# Patient Record
Sex: FEMALE | Race: WHITE | NOT HISPANIC OR LATINO | Employment: UNEMPLOYED | URBAN - METROPOLITAN AREA
[De-identification: names, ages, dates, MRNs, and addresses within clinical notes are randomized per-mention and may not be internally consistent; named-entity substitution may affect disease eponyms.]

---

## 2017-07-19 ENCOUNTER — ALLSCRIPTS OFFICE VISIT (OUTPATIENT)
Dept: OTHER | Facility: OTHER | Age: 52
End: 2017-07-19

## 2017-07-26 ENCOUNTER — ALLSCRIPTS OFFICE VISIT (OUTPATIENT)
Dept: OTHER | Facility: OTHER | Age: 52
End: 2017-07-26

## 2018-01-13 VITALS
HEIGHT: 67 IN | DIASTOLIC BLOOD PRESSURE: 60 MMHG | WEIGHT: 138 LBS | HEART RATE: 67 BPM | OXYGEN SATURATION: 97 % | SYSTOLIC BLOOD PRESSURE: 114 MMHG | TEMPERATURE: 97.9 F | BODY MASS INDEX: 21.66 KG/M2

## 2018-01-14 VITALS
BODY MASS INDEX: 21.48 KG/M2 | HEART RATE: 96 BPM | DIASTOLIC BLOOD PRESSURE: 74 MMHG | SYSTOLIC BLOOD PRESSURE: 94 MMHG | WEIGHT: 137.13 LBS

## 2018-03-01 ENCOUNTER — OFFICE VISIT (OUTPATIENT)
Dept: INTERNAL MEDICINE CLINIC | Facility: CLINIC | Age: 53
End: 2018-03-01

## 2018-03-01 DIAGNOSIS — F31.63 BIPOLAR 1 DISORDER, MIXED, SEVERE (HCC): Primary | ICD-10-CM

## 2018-03-01 PROCEDURE — 99213 OFFICE O/P EST LOW 20 MIN: CPT | Performed by: NURSE PRACTITIONER

## 2018-03-01 RX ORDER — CARBAMAZEPINE 200 MG/1
200 TABLET, EXTENDED RELEASE ORAL 2 TIMES DAILY
Qty: 12 TABLET | Refills: 0 | Status: SHIPPED | OUTPATIENT
Start: 2018-03-01 | End: 2018-03-01

## 2018-03-01 RX ORDER — CARBAMAZEPINE 200 MG/1
200 TABLET ORAL 2 TIMES DAILY
Qty: 12 TABLET | Refills: 0 | Status: SHIPPED | OUTPATIENT
Start: 2018-03-01 | End: 2018-03-05 | Stop reason: SDUPTHER

## 2018-03-01 RX ORDER — RISPERIDONE 1 MG/1
1 TABLET, FILM COATED ORAL
Qty: 6 TABLET | Refills: 0 | Status: SHIPPED | OUTPATIENT
Start: 2018-03-01

## 2018-03-01 NOTE — PROGRESS NOTES
Assessment/Plan:    Bipolar with manic episode she has been off of her medications for the last 6 months  She is not of any threat to herself or others  She has no suicidal homicidal ideations  I reviewed her last medications that were last filled in July  We will restart Tegretol 200 milligrams b i d  and risperidone 1 milligram at bedtime  I have given her local numbers for Psychiatry to try to establish with  If she has worsening of symptoms she understands that she needs to go to the emergency room for crisis intervention  She will follow back on Monday appointment has been made  Diagnoses and all orders for this visit:    Bipolar 1 disorder, mixed, severe ([facility] [address] )  -     Discontinue: carBAMazepine (TEGretol XR) 200 mg 12 hr tablet; Take 1 tablet (200 mg total) by mouth 2 (two) times a day  -     risperiDONE (RisperDAL) 1 mg tablet; Take 1 tablet (1 mg total) by mouth daily at bedtime  -     carBAMazepine (TEGretol) 200 mg tablet; Take 1 tablet (200 mg total) by mouth 2 (two) times a day          Subjective:      Patient ID: Doe Mora is a 50 y o  female  Patient presents today and was originally being evaluated by a another provider in the office  Patient became quite anxious and verbally abusive to the provider  It was noted that this was our patient so I entered the room  Patient states that she is having a manic episode  She is been doing well but she did come off of her medications back in July because she missed her [facility] appointment in the not would not refill her meds  The most part she had been doing well into the last several weeks for she felt that she was becoming manic  She also complains of depression  She has no suicidal homicidal ideations  She is not drinking alcohol          The following portions of the patient's history were reviewed and updated as appropriate: allergies, current medications, past family history, past medical history, past social history, past surgical history and problem list     Review of Systems   Constitutional: Positive for activity change and appetite change  Negative for chills, diaphoresis, fatigue, fever and unexpected weight change  HENT: Negative  Negative for sneezing  Eyes: Negative for visual disturbance  Respiratory: Negative for chest tightness and shortness of breath  Cardiovascular: Negative for chest pain, palpitations and leg swelling  Gastrointestinal: Negative for abdominal pain and blood in stool  Endocrine: Negative for cold intolerance, heat intolerance, polydipsia, polyphagia and polyuria  Genitourinary: Negative for difficulty urinating, dysuria, frequency and urgency  Musculoskeletal: Negative for arthralgias and myalgias  Skin: Negative for rash and wound  Neurological: Negative for dizziness, weakness, light-headedness and headaches  Hematological: Negative for adenopathy  Psychiatric/Behavioral: Positive for agitation, behavioral problems and decreased concentration  Negative for confusion, dysphoric mood and sleep disturbance  The patient is nervous/anxious and is hyperactive  Objective: There were no vitals taken for this visit  Physical Exam   Constitutional: She is oriented to person, place, and time  She appears well-developed  No distress  HENT:   Head: Normocephalic and atraumatic  Nose: Nose normal    Mouth/Throat: Oropharynx is clear and moist    Eyes: Conjunctivae and EOM are normal  Pupils are equal, round, and reactive to light  Neck: Normal range of motion  Neck supple  No JVD present  No tracheal deviation present  No thyromegaly present  Cardiovascular: Normal rate, regular rhythm, normal heart sounds and intact distal pulses  Exam reveals no gallop and no friction rub  No murmur heard  Pulmonary/Chest: Effort normal and breath sounds normal  No respiratory distress  She has no wheezes  She has no rales  Abdominal: Soft   Bowel sounds are normal  She exhibits no distension  There is no tenderness  Musculoskeletal: Normal range of motion  She exhibits no edema  Lymphadenopathy:     She has no cervical adenopathy  Neurological: She is alert and oriented to person, place, and time  No cranial nerve deficit  Skin: Skin is warm and dry  No rash noted  She is not diaphoretic  Psychiatric: She has a normal mood and affect   Her behavior is normal  Judgment and thought content normal

## 2018-03-01 NOTE — PATIENT INSTRUCTIONS
Bipolar with manic episode she has been off of her medications for the last 6 months  She is not of any threat to herself or others  She has no suicidal homicidal ideations  I reviewed her last medications that were last filled in July  We will restart Tegretol 200 milligrams b i d  and risperidone 1 milligram at bedtime  I have given her local numbers for Psychiatry to try to establish with  If she has worsening of symptoms she understands that she needs to go to the emergency room for crisis intervention  She will follow back on Monday appointment has been made

## 2018-03-05 ENCOUNTER — OFFICE VISIT (OUTPATIENT)
Dept: INTERNAL MEDICINE CLINIC | Facility: CLINIC | Age: 53
End: 2018-03-05
Payer: COMMERCIAL

## 2018-03-05 ENCOUNTER — APPOINTMENT (OUTPATIENT)
Dept: LAB | Facility: CLINIC | Age: 53
End: 2018-03-05
Payer: COMMERCIAL

## 2018-03-05 ENCOUNTER — TRANSCRIBE ORDERS (OUTPATIENT)
Dept: LAB | Facility: CLINIC | Age: 53
End: 2018-03-05

## 2018-03-05 VITALS
WEIGHT: 141 LBS | SYSTOLIC BLOOD PRESSURE: 120 MMHG | BODY MASS INDEX: 22.08 KG/M2 | DIASTOLIC BLOOD PRESSURE: 76 MMHG | HEART RATE: 110 BPM

## 2018-03-05 DIAGNOSIS — F31.9 BIPOLAR 1 DISORDER (HCC): ICD-10-CM

## 2018-03-05 DIAGNOSIS — N92.6 MENSTRUAL PERIODS, ABNORMAL: ICD-10-CM

## 2018-03-05 DIAGNOSIS — E78.49 OTHER HYPERLIPIDEMIA: ICD-10-CM

## 2018-03-05 DIAGNOSIS — I10 ESSENTIAL HYPERTENSION: ICD-10-CM

## 2018-03-05 DIAGNOSIS — Z11.59 NEED FOR HEPATITIS C SCREENING TEST: ICD-10-CM

## 2018-03-05 DIAGNOSIS — R73.01 IMPAIRED FASTING GLUCOSE: ICD-10-CM

## 2018-03-05 DIAGNOSIS — F31.63 BIPOLAR 1 DISORDER, MIXED, SEVERE (HCC): ICD-10-CM

## 2018-03-05 DIAGNOSIS — F31.9 BIPOLAR 1 DISORDER (HCC): Primary | ICD-10-CM

## 2018-03-05 LAB
ALBUMIN SERPL BCP-MCNC: 3.7 G/DL (ref 3.5–5)
ALP SERPL-CCNC: 81 U/L (ref 46–116)
ALT SERPL W P-5'-P-CCNC: 16 U/L (ref 12–78)
ANION GAP SERPL CALCULATED.3IONS-SCNC: 5 MMOL/L (ref 4–13)
AST SERPL W P-5'-P-CCNC: 14 U/L (ref 5–45)
BASOPHILS # BLD AUTO: 0.03 THOUSANDS/ΜL (ref 0–0.1)
BASOPHILS NFR BLD AUTO: 1 % (ref 0–1)
BILIRUB SERPL-MCNC: 0.26 MG/DL (ref 0.2–1)
BUN SERPL-MCNC: 10 MG/DL (ref 5–25)
CALCIUM SERPL-MCNC: 8.4 MG/DL (ref 8.3–10.1)
CHLORIDE SERPL-SCNC: 104 MMOL/L (ref 100–108)
CHOLEST SERPL-MCNC: 160 MG/DL (ref 50–200)
CO2 SERPL-SCNC: 31 MMOL/L (ref 21–32)
CREAT SERPL-MCNC: 0.81 MG/DL (ref 0.6–1.3)
EOSINOPHIL # BLD AUTO: 0.31 THOUSAND/ΜL (ref 0–0.61)
EOSINOPHIL NFR BLD AUTO: 5 % (ref 0–6)
ERYTHROCYTE [DISTWIDTH] IN BLOOD BY AUTOMATED COUNT: 14.2 % (ref 11.6–15.1)
EST. AVERAGE GLUCOSE BLD GHB EST-MCNC: 114 MG/DL
GFR SERPL CREATININE-BSD FRML MDRD: 85 ML/MIN/1.73SQ M
GLUCOSE SERPL-MCNC: 84 MG/DL (ref 65–140)
HBA1C MFR BLD: 5.6 % (ref 4.2–6.3)
HCG SERPL QL: NEGATIVE
HCT VFR BLD AUTO: 39.7 % (ref 34.8–46.1)
HDLC SERPL-MCNC: 62 MG/DL (ref 40–60)
HGB BLD-MCNC: 13.3 G/DL (ref 11.5–15.4)
LDLC SERPL CALC-MCNC: 75 MG/DL (ref 0–100)
LYMPHOCYTES # BLD AUTO: 2.22 THOUSANDS/ΜL (ref 0.6–4.47)
LYMPHOCYTES NFR BLD AUTO: 38 % (ref 14–44)
MCH RBC QN AUTO: 30.1 PG (ref 26.8–34.3)
MCHC RBC AUTO-ENTMCNC: 33.5 G/DL (ref 31.4–37.4)
MCV RBC AUTO: 90 FL (ref 82–98)
MONOCYTES # BLD AUTO: 0.55 THOUSAND/ΜL (ref 0.17–1.22)
MONOCYTES NFR BLD AUTO: 9 % (ref 4–12)
NEUTROPHILS # BLD AUTO: 2.79 THOUSANDS/ΜL (ref 1.85–7.62)
NEUTS SEG NFR BLD AUTO: 47 % (ref 43–75)
NRBC BLD AUTO-RTO: 0 /100 WBCS
PLATELET # BLD AUTO: 330 THOUSANDS/UL (ref 149–390)
PMV BLD AUTO: 10 FL (ref 8.9–12.7)
POTASSIUM SERPL-SCNC: 3.7 MMOL/L (ref 3.5–5.3)
PROT SERPL-MCNC: 7 G/DL (ref 6.4–8.2)
RBC # BLD AUTO: 4.42 MILLION/UL (ref 3.81–5.12)
SODIUM SERPL-SCNC: 140 MMOL/L (ref 136–145)
TRIGL SERPL-MCNC: 116 MG/DL
TSH SERPL DL<=0.05 MIU/L-ACNC: 0.59 UIU/ML (ref 0.36–3.74)
WBC # BLD AUTO: 5.92 THOUSAND/UL (ref 4.31–10.16)

## 2018-03-05 PROCEDURE — 99213 OFFICE O/P EST LOW 20 MIN: CPT | Performed by: NURSE PRACTITIONER

## 2018-03-05 PROCEDURE — 80053 COMPREHEN METABOLIC PANEL: CPT

## 2018-03-05 PROCEDURE — 85025 COMPLETE CBC W/AUTO DIFF WBC: CPT

## 2018-03-05 PROCEDURE — 80061 LIPID PANEL: CPT

## 2018-03-05 PROCEDURE — 86803 HEPATITIS C AB TEST: CPT

## 2018-03-05 PROCEDURE — 84443 ASSAY THYROID STIM HORMONE: CPT

## 2018-03-05 PROCEDURE — 84703 CHORIONIC GONADOTROPIN ASSAY: CPT

## 2018-03-05 PROCEDURE — 83036 HEMOGLOBIN GLYCOSYLATED A1C: CPT

## 2018-03-05 PROCEDURE — 36415 COLL VENOUS BLD VENIPUNCTURE: CPT

## 2018-03-05 PROCEDURE — 3074F SYST BP LT 130 MM HG: CPT | Performed by: NURSE PRACTITIONER

## 2018-03-05 PROCEDURE — 3078F DIAST BP <80 MM HG: CPT | Performed by: NURSE PRACTITIONER

## 2018-03-05 RX ORDER — CARBAMAZEPINE 200 MG/1
200 TABLET ORAL 2 TIMES DAILY
Qty: 60 TABLET | Refills: 0 | Status: SHIPPED | OUTPATIENT
Start: 2018-03-05 | End: 2018-03-05 | Stop reason: SDUPTHER

## 2018-03-05 RX ORDER — CARBAMAZEPINE 200 MG/1
200 TABLET ORAL 2 TIMES DAILY
Qty: 60 TABLET | Refills: 0 | Status: SHIPPED | OUTPATIENT
Start: 2018-03-05

## 2018-03-05 NOTE — PATIENT INSTRUCTIONS
Bipolar type 1 with manic episode she is improved carbapazapine the risperidone was too sedating  I discussed she can take 1/2 tablet as needed  We will continue current dose of carbamazepine add Latuda- she is still having hard time finding psychiatric care  She will call [facility] to see if they will take her back    Follow up 3/12

## 2018-03-05 NOTE — PROGRESS NOTES
Assessment/Plan:    Bipolar type 1 with manic episode she is improved carbapazapine the risperidone was too sedating  I discussed she can take 1/2 tablet as needed  We will continue current dose of carbamazepine add Latuda- she is still having hard time finding psychiatric care  She will call [facility] to see if they will take her back   Follow up 3/12       Diagnoses and all orders for this visit:    Bipolar 1 disorder ([address] )  -     lurasidone (LATUDA) 20 mg tablet; Take 1 tablet (20 mg total) by mouth daily with breakfast  -     TSH, 3rd generation; Future    Bipolar 1 disorder, mixed, severe (HCC)  -     carBAMazepine (TEGretol) 200 mg tablet; Take 1 tablet (200 mg total) by mouth 2 (two) times a day    Need for hepatitis C screening test  -     Hepatitis C antibody; Future    Impaired fasting glucose    Essential hypertension  -     Comprehensive metabolic panel; Future  -     TSH, 3rd generation; Future    Other hyperlipidemia  -     CBC and differential; Future  -     Lipid panel; Future  -     HEMOGLOBIN A1C W/ EAG ESTIMATION; Future    Menstrual periods, abnormal  -     Ambulatory referral to Obstetrics / Gynecology; Future  -     Pregnancy Test (HCG Qualitative); Future          Subjective:      Patient ID: Alisha Cuello is a 50 y o  female  Feeling better, resperidone too sedating, feeling 25% better  Still w/ mood swings  No h/s ideations  Having hard time finding psych        The following portions of the patient's history were reviewed and updated as appropriate: allergies, current medications, past family history, past medical history, past social history, past surgical history and problem list     Review of Systems   Constitutional: Negative for appetite change, chills, diaphoresis, fatigue, fever and unexpected weight change  HENT: Negative for postnasal drip and sneezing  Eyes: Negative for visual disturbance  Respiratory: Negative for chest tightness and shortness of breath  Cardiovascular: Negative for chest pain, palpitations and leg swelling  Gastrointestinal: Negative for abdominal pain and blood in stool  Endocrine: Negative for cold intolerance, heat intolerance, polydipsia, polyphagia and polyuria  Genitourinary: Negative for difficulty urinating, dysuria, frequency and urgency  Musculoskeletal: Negative for arthralgias and myalgias  Skin: Negative for rash and wound  Neurological: Negative for dizziness, weakness, light-headedness and headaches  Hematological: Negative for adenopathy  Psychiatric/Behavioral: Negative for confusion, dysphoric mood and sleep disturbance  The patient is not nervous/anxious  Objective:      /76   Pulse (!) 110   Wt 64 kg (141 lb)   BMI 22 08 kg/m²          Physical Exam   Constitutional: She appears well-developed and well-nourished  No distress  HENT:   Head: Normocephalic and atraumatic  Eyes: Conjunctivae and EOM are normal  Pupils are equal, round, and reactive to light  Neck: Normal range of motion  Neck supple  Pulmonary/Chest: Effort normal and breath sounds normal    Abdominal: Soft  Bowel sounds are normal    Neurological: She is alert  Psychiatric: Thought content normal  Her mood appears anxious  Her speech is rapid and/or pressured and tangential  She is hyperactive   Cognition and memory are normal

## 2018-03-06 LAB — HCV AB SER QL: NORMAL

## 2018-03-07 NOTE — PROGRESS NOTES
History of Present Illness    Revaccination   Vaccine Information: Vaccine(s) Given (names): [id number]  Spoke with patient regarding vaccine out of temperature range  Action(s): Pt will be revaccinated  Appointment scheduled: [id number] sd  Revaccination Completed: [id number]  Active Problems    1  Abnormal cytology (792 9) (R89 6)   2  Acute bronchitis (466 0) (J20 9)   3  Allergic rhinitis (477 9) (J30 9)   4  Anxiety (300 00) (F41 9)   5  Bell's palsy (351 0) (G51 0)   6  Bipolar disorder (296 80) (F31 9)   7  Cervicalgia (723 1) (M54 2)   8  COPD with exacerbation (491 21) (J44 1)   9  Depression (311) (F32 9)   10  Dermatitis (692 9) (L30 9)   11  Encounter for screening mammogram for malignant neoplasm of breast (V76 12)    (Z12 31)   12  Flank pain (789 09) (R10 9)   13  Flu vaccine need (V04 81) (Z23)   14  Hoarseness (784 42) (R49 0)   15  Hyperlipidemia (272 4) (E78 5)   16  Impaired fasting glucose (790 21) (R73 01)   17  Ingrown toenail (703 0) (L60 0)   18  Microscopic hematuria (599 72) (R31 29)   19  Need for pneumococcal vaccine (V03 82) (Z23)   20  Need for revaccination (V05 9) (Z23)   21  Nephrolithiasis (592 0) (N20 0)   22  Other screening mammogram (V76 12) (Z12 31)   23  Pap smear, low-risk (V76 2) (Z01 419)   24  Pneumonia (486) (J18 9)   25  Thyroid disorder (246 9) (E07 9)   26  Toxic effect of tobacco (989 84,E980 9) (T65 291A)   27  Undersocialized Aggressive Conduct Disorder (312 00)   28  Urinary incontinence in female (788 30) (R32)   29  Visit for screening mammogram (M68 45) (Z12 31)    Immunizations  Influenza --- Amor Shown: [id number] Jacobsen: 11-Nov-2015   PPSV --- Series1: 11-Nov-2015     Current Meds   1  Acetaminophen-Codeine #3 300-30 MG Oral Tablet; Take one tablet every four hours as   needed for pain   2  Albuterol Sulfate (2 5 MG/3ML) 0 083% Inhalation Nebulization Solution; USE 1 UNIT   DOSE IN NEBULIZER EVERY 4 TO 6 HOURS AS NEEDED   3   Cyclobenzaprine HCl - 10 MG Oral Tablet; take 1 tablet at night as needed for pain   4  Fluticasone Propionate 50 MCG/ACT Nasal Suspension; USE 2 SPRAYS IN EACH   NOSTRIL ONCE DAILY   5  Gabapentin 300 MG Oral Capsule; take 1 capsule 3 times daily as needed   6  HydrOXYzine HCl - 50 MG Oral Tablet; 1 po bid, up to 5x per day prn   7  Ibuprofen 800 MG Oral Tablet; Take one tablet every 8 hours as needed for pain   8  Ipratropium-Albuterol 0 5-2 5 (3) MG/3ML Inhalation Solution; USE 1 UNIT DOSE IN   NEBULIZER EVERY 4 HOURS AS NEEDED   9  Lithium Carbonate 150 MG Oral Capsule; TAKE 1 CAPSULE TWICE DAILY   10  ProAir  (90 Base) MCG/ACT Inhalation Aerosol Solution; INHALE 2 PUFFS    EVERY 4 HOURS AS NEEDED   11  Triamcinolone Acetonide 0 5 % External Cream; APPLY SPARINGLY AND MASSAGE IN    TWICE DAILY   12  Voltaren 1 % Transdermal Gel; APPLY SPARINGLY TO AFFECTED AREA(S) ONCE DAILY    Allergies    1   No Known Drug Allergies    Signatures   Electronically signed by : UMANG James ; Dec 21 2016 11:09AM EST

## 2018-03-19 ENCOUNTER — OFFICE VISIT (OUTPATIENT)
Dept: INTERNAL MEDICINE CLINIC | Facility: CLINIC | Age: 53
End: 2018-03-19
Payer: COMMERCIAL

## 2018-03-19 VITALS
WEIGHT: 136 LBS | HEIGHT: 67 IN | DIASTOLIC BLOOD PRESSURE: 82 MMHG | HEART RATE: 88 BPM | OXYGEN SATURATION: 97 % | SYSTOLIC BLOOD PRESSURE: 118 MMHG | BODY MASS INDEX: 21.35 KG/M2 | RESPIRATION RATE: 18 BRPM

## 2018-03-19 DIAGNOSIS — F31.60 BIPOLAR AFFECTIVE DISORDER, CURRENT EPISODE MIXED, CURRENT EPISODE SEVERITY UNSPECIFIED (HCC): Primary | ICD-10-CM

## 2018-03-19 PROCEDURE — 99213 OFFICE O/P EST LOW 20 MIN: CPT | Performed by: NURSE PRACTITIONER

## 2018-03-19 PROCEDURE — 3008F BODY MASS INDEX DOCD: CPT | Performed by: NURSE PRACTITIONER

## 2018-03-19 NOTE — PATIENT INSTRUCTIONS
Bipolar still somewhat manic but markedly improved, she is actually going going for inpatient rehab on Thursday of this week for meth and marijuana use I discussed this is actually a good planned since they have a dual diagnosis program and they will help adjust medications and help with outpatient services    She will follow up after she is discharged from rehab

## 2018-03-19 NOTE — PROGRESS NOTES
Assessment/Plan:    Bipolar still somewhat manic but markedly improved, she is actually going going for inpatient rehab on Thursday of this week for meth and marijuana use I discussed this is actually a good planned since they have a dual diagnosis program and they will help adjust medications and help with outpatient services  She will follow up after she is discharged from rehab       Diagnoses and all orders for this visit:    Bipolar affective disorder, current episode mixed, current episode severity unspecified ([facility] )          Subjective:      Patient ID: Eris Monge is a 50 y o  female  Doing better on her medications she has less anxiety less depression she admits though that she has been regularly using marijuana and on and off using methamphetamine she is scheduled to go to an inpatient rehab on Thursday        The following portions of the patient's history were reviewed and updated as appropriate: allergies, current medications, past family history, past medical history, past social history, past surgical history and problem list     Review of Systems   Constitutional: Negative for appetite change, chills, diaphoresis, fatigue, fever and unexpected weight change  HENT: Negative for postnasal drip and sneezing  Eyes: Negative for visual disturbance  Respiratory: Negative for chest tightness and shortness of breath  Cardiovascular: Negative for chest pain, palpitations and leg swelling  Gastrointestinal: Negative for abdominal pain and blood in stool  Endocrine: Negative for cold intolerance, heat intolerance, polydipsia, polyphagia and polyuria  Genitourinary: Negative for difficulty urinating, dysuria, frequency and urgency  Musculoskeletal: Negative for arthralgias and myalgias  Skin: Negative for rash and wound  Neurological: Negative for dizziness, weakness, light-headedness and headaches  Hematological: Negative for adenopathy     Psychiatric/Behavioral: Negative for confusion, dysphoric mood and sleep disturbance  The patient is not nervous/anxious  Objective:      /82   Pulse 88   Resp 18   Ht 5' 7" (1 702 m)   Wt 61 7 kg (136 lb)   SpO2 97%   BMI 21 30 kg/m²          Physical Exam   Constitutional: She is oriented to person, place, and time  She appears well-developed  No distress  HENT:   Head: Normocephalic and atraumatic  Nose: Nose normal    Mouth/Throat: Oropharynx is clear and moist    Eyes: Conjunctivae and EOM are normal  Pupils are equal, round, and reactive to light  Neck: Normal range of motion  Neck supple  No JVD present  No tracheal deviation present  No thyromegaly present  Cardiovascular: Normal rate, regular rhythm, normal heart sounds and intact distal pulses  Exam reveals no gallop and no friction rub  No murmur heard  Pulmonary/Chest: Effort normal and breath sounds normal  No respiratory distress  She has no wheezes  She has no rales  Abdominal: Soft  Bowel sounds are normal  She exhibits no distension  There is no tenderness  Musculoskeletal: Normal range of motion  She exhibits no edema  Lymphadenopathy:     She has no cervical adenopathy  Neurological: She is alert and oriented to person, place, and time  No cranial nerve deficit  Skin: Skin is warm and dry  No rash noted  She is not diaphoretic  Psychiatric: She has a normal mood and affect   Her behavior is normal  Judgment and thought content normal

## 2018-12-15 ENCOUNTER — APPOINTMENT (EMERGENCY)
Dept: RADIOLOGY | Facility: HOSPITAL | Age: 53
End: 2018-12-15
Payer: COMMERCIAL

## 2018-12-15 ENCOUNTER — HOSPITAL ENCOUNTER (EMERGENCY)
Facility: HOSPITAL | Age: 53
Discharge: HOME | End: 2018-12-15
Attending: EMERGENCY MEDICINE
Payer: COMMERCIAL

## 2018-12-15 ENCOUNTER — APPOINTMENT (EMERGENCY)
Dept: CT IMAGING | Facility: HOSPITAL | Age: 53
End: 2018-12-15
Payer: COMMERCIAL

## 2018-12-15 VITALS
HEART RATE: 77 BPM | TEMPERATURE: 98.1 F | RESPIRATION RATE: 20 BRPM | SYSTOLIC BLOOD PRESSURE: 99 MMHG | OXYGEN SATURATION: 91 % | DIASTOLIC BLOOD PRESSURE: 55 MMHG

## 2018-12-15 DIAGNOSIS — S29.012A STRAIN OF MID-BACK, INITIAL ENCOUNTER: ICD-10-CM

## 2018-12-15 DIAGNOSIS — N39.0 UTI (URINARY TRACT INFECTION): ICD-10-CM

## 2018-12-15 DIAGNOSIS — J20.9 ACUTE BRONCHITIS: Primary | ICD-10-CM

## 2018-12-15 LAB
ALBUMIN SERPL BCP-MCNC: 3.7 G/DL (ref 3.5–5)
ALP SERPL-CCNC: 77 U/L (ref 46–116)
ALT SERPL W P-5'-P-CCNC: 15 U/L (ref 12–78)
ANION GAP SERPL CALCULATED.3IONS-SCNC: 8 MMOL/L (ref 4–13)
AST SERPL W P-5'-P-CCNC: 17 U/L (ref 5–45)
BACTERIA UR QL AUTO: ABNORMAL /HPF
BASOPHILS # BLD AUTO: 0.01 THOUSANDS/ΜL (ref 0–0.1)
BASOPHILS NFR BLD AUTO: 0 % (ref 0–1)
BILIRUB DIRECT SERPL-MCNC: 0.07 MG/DL (ref 0–0.2)
BILIRUB SERPL-MCNC: 0.3 MG/DL (ref 0.2–1)
BILIRUB UR QL STRIP: NEGATIVE
BUN SERPL-MCNC: 12 MG/DL (ref 5–25)
CALCIUM SERPL-MCNC: 8.9 MG/DL (ref 8.3–10.1)
CHLORIDE SERPL-SCNC: 100 MMOL/L (ref 100–108)
CLARITY UR: ABNORMAL
CO2 SERPL-SCNC: 28 MMOL/L (ref 21–32)
COLOR UR: YELLOW
CREAT SERPL-MCNC: 0.88 MG/DL (ref 0.6–1.3)
EOSINOPHIL # BLD AUTO: 0.06 THOUSAND/ΜL (ref 0–0.61)
EOSINOPHIL NFR BLD AUTO: 2 % (ref 0–6)
ERYTHROCYTE [DISTWIDTH] IN BLOOD BY AUTOMATED COUNT: 13.7 % (ref 11.6–15.1)
EXT PREG TEST URINE: NEGATIVE
GFR SERPL CREATININE-BSD FRML MDRD: 77 ML/MIN/1.73SQ M
GLUCOSE SERPL-MCNC: 102 MG/DL (ref 65–140)
GLUCOSE UR STRIP-MCNC: NEGATIVE MG/DL
HCT VFR BLD AUTO: 43.3 % (ref 34.8–46.1)
HGB BLD-MCNC: 14.5 G/DL (ref 11.5–15.4)
HGB UR QL STRIP.AUTO: ABNORMAL
HYALINE CASTS #/AREA URNS LPF: ABNORMAL /LPF
IMM GRANULOCYTES # BLD AUTO: 0.02 THOUSAND/UL (ref 0–0.2)
IMM GRANULOCYTES NFR BLD AUTO: 1 % (ref 0–2)
KETONES UR STRIP-MCNC: ABNORMAL MG/DL
LEUKOCYTE ESTERASE UR QL STRIP: NEGATIVE
LIPASE SERPL-CCNC: 101 U/L (ref 73–393)
LYMPHOCYTES # BLD AUTO: 0.79 THOUSANDS/ΜL (ref 0.6–4.47)
LYMPHOCYTES NFR BLD AUTO: 21 % (ref 14–44)
MCH RBC QN AUTO: 29.8 PG (ref 26.8–34.3)
MCHC RBC AUTO-ENTMCNC: 33.5 G/DL (ref 31.4–37.4)
MCV RBC AUTO: 89 FL (ref 82–98)
MONOCYTES # BLD AUTO: 0.37 THOUSAND/ΜL (ref 0.17–1.22)
MONOCYTES NFR BLD AUTO: 10 % (ref 4–12)
NEUTROPHILS # BLD AUTO: 2.52 THOUSANDS/ΜL (ref 1.85–7.62)
NEUTS SEG NFR BLD AUTO: 66 % (ref 43–75)
NITRITE UR QL STRIP: NEGATIVE
NON-SQ EPI CELLS URNS QL MICRO: ABNORMAL /HPF
NRBC BLD AUTO-RTO: 0 /100 WBCS
PH UR STRIP.AUTO: 5.5 [PH] (ref 4.5–8)
PLATELET # BLD AUTO: 238 THOUSANDS/UL (ref 149–390)
PMV BLD AUTO: 10 FL (ref 8.9–12.7)
POTASSIUM SERPL-SCNC: 3.8 MMOL/L (ref 3.5–5.3)
PROT SERPL-MCNC: 7.5 G/DL (ref 6.4–8.2)
PROT UR STRIP-MCNC: ABNORMAL MG/DL
RBC # BLD AUTO: 4.87 MILLION/UL (ref 3.81–5.12)
RBC #/AREA URNS AUTO: ABNORMAL /HPF
SODIUM SERPL-SCNC: 136 MMOL/L (ref 136–145)
SP GR UR STRIP.AUTO: >=1.03 (ref 1–1.03)
TROPONIN I SERPL-MCNC: <0.02 NG/ML
UROBILINOGEN UR QL STRIP.AUTO: 0.2 E.U./DL
WBC # BLD AUTO: 3.77 THOUSAND/UL (ref 4.31–10.16)
WBC #/AREA URNS AUTO: ABNORMAL /HPF

## 2018-12-15 PROCEDURE — 87077 CULTURE AEROBIC IDENTIFY: CPT | Performed by: EMERGENCY MEDICINE

## 2018-12-15 PROCEDURE — 81001 URINALYSIS AUTO W/SCOPE: CPT | Performed by: EMERGENCY MEDICINE

## 2018-12-15 PROCEDURE — 87086 URINE CULTURE/COLONY COUNT: CPT | Performed by: EMERGENCY MEDICINE

## 2018-12-15 PROCEDURE — 99284 EMERGENCY DEPT VISIT MOD MDM: CPT

## 2018-12-15 PROCEDURE — 96361 HYDRATE IV INFUSION ADD-ON: CPT

## 2018-12-15 PROCEDURE — 83690 ASSAY OF LIPASE: CPT | Performed by: EMERGENCY MEDICINE

## 2018-12-15 PROCEDURE — 71046 X-RAY EXAM CHEST 2 VIEWS: CPT

## 2018-12-15 PROCEDURE — 93005 ELECTROCARDIOGRAM TRACING: CPT

## 2018-12-15 PROCEDURE — 81025 URINE PREGNANCY TEST: CPT | Performed by: EMERGENCY MEDICINE

## 2018-12-15 PROCEDURE — 85025 COMPLETE CBC W/AUTO DIFF WBC: CPT | Performed by: EMERGENCY MEDICINE

## 2018-12-15 PROCEDURE — 96374 THER/PROPH/DIAG INJ IV PUSH: CPT

## 2018-12-15 PROCEDURE — 94640 AIRWAY INHALATION TREATMENT: CPT

## 2018-12-15 PROCEDURE — 80048 BASIC METABOLIC PNL TOTAL CA: CPT | Performed by: EMERGENCY MEDICINE

## 2018-12-15 PROCEDURE — 80076 HEPATIC FUNCTION PANEL: CPT | Performed by: EMERGENCY MEDICINE

## 2018-12-15 PROCEDURE — 84484 ASSAY OF TROPONIN QUANT: CPT | Performed by: EMERGENCY MEDICINE

## 2018-12-15 PROCEDURE — 36415 COLL VENOUS BLD VENIPUNCTURE: CPT | Performed by: EMERGENCY MEDICINE

## 2018-12-15 PROCEDURE — 74177 CT ABD & PELVIS W/CONTRAST: CPT

## 2018-12-15 PROCEDURE — 87186 SC STD MICRODIL/AGAR DIL: CPT | Performed by: EMERGENCY MEDICINE

## 2018-12-15 RX ORDER — PREDNISONE 20 MG/1
60 TABLET ORAL ONCE
Status: COMPLETED | OUTPATIENT
Start: 2018-12-15 | End: 2018-12-15

## 2018-12-15 RX ORDER — BACLOFEN 10 MG/1
10 TABLET ORAL ONCE
Status: COMPLETED | OUTPATIENT
Start: 2018-12-15 | End: 2018-12-15

## 2018-12-15 RX ORDER — ALBUTEROL SULFATE 90 UG/1
2 AEROSOL, METERED RESPIRATORY (INHALATION) ONCE
Status: COMPLETED | OUTPATIENT
Start: 2018-12-15 | End: 2018-12-15

## 2018-12-15 RX ORDER — KETOROLAC TROMETHAMINE 30 MG/ML
15 INJECTION, SOLUTION INTRAMUSCULAR; INTRAVENOUS ONCE
Status: COMPLETED | OUTPATIENT
Start: 2018-12-15 | End: 2018-12-15

## 2018-12-15 RX ORDER — NAPROXEN 500 MG/1
500 TABLET ORAL EVERY 12 HOURS PRN
Qty: 20 TABLET | Refills: 0 | Status: SHIPPED | OUTPATIENT
Start: 2018-12-15

## 2018-12-15 RX ORDER — LIDOCAINE 50 MG/G
1 PATCH TOPICAL ONCE
Status: DISCONTINUED | OUTPATIENT
Start: 2018-12-15 | End: 2018-12-15 | Stop reason: HOSPADM

## 2018-12-15 RX ORDER — BACLOFEN 10 MG/1
10 TABLET ORAL 3 TIMES DAILY PRN
Qty: 10 TABLET | Refills: 0 | Status: SHIPPED | OUTPATIENT
Start: 2018-12-15 | End: 2019-02-14

## 2018-12-15 RX ORDER — AZITHROMYCIN 250 MG/1
TABLET, FILM COATED ORAL
Qty: 6 TABLET | Refills: 0 | Status: SHIPPED | OUTPATIENT
Start: 2018-12-15 | End: 2018-12-19

## 2018-12-15 RX ORDER — PREDNISONE 20 MG/1
40 TABLET ORAL DAILY
Qty: 8 TABLET | Refills: 0 | Status: SHIPPED | OUTPATIENT
Start: 2018-12-16 | End: 2018-12-20

## 2018-12-15 RX ADMIN — ALBUTEROL SULFATE 2 PUFF: 90 AEROSOL, METERED RESPIRATORY (INHALATION) at 15:33

## 2018-12-15 RX ADMIN — IPRATROPIUM BROMIDE 0.5 MG: 0.5 SOLUTION RESPIRATORY (INHALATION) at 13:14

## 2018-12-15 RX ADMIN — BACLOFEN 10 MG: 10 TABLET ORAL at 13:22

## 2018-12-15 RX ADMIN — PREDNISONE 60 MG: 20 TABLET ORAL at 13:02

## 2018-12-15 RX ADMIN — SODIUM CHLORIDE 1000 ML: 0.9 INJECTION, SOLUTION INTRAVENOUS at 13:02

## 2018-12-15 RX ADMIN — IOHEXOL 100 ML: 350 INJECTION, SOLUTION INTRAVENOUS at 13:53

## 2018-12-15 RX ADMIN — LIDOCAINE 1 PATCH: 50 PATCH TOPICAL at 13:04

## 2018-12-15 RX ADMIN — KETOROLAC TROMETHAMINE 15 MG: 30 INJECTION, SOLUTION INTRAMUSCULAR at 13:03

## 2018-12-15 RX ADMIN — ALBUTEROL SULFATE 5 MG: 2.5 SOLUTION RESPIRATORY (INHALATION) at 13:14

## 2018-12-15 NOTE — DISCHARGE INSTRUCTIONS
Acute Bronchitis   WHAT YOU NEED TO KNOW:   Acute bronchitis is swelling and irritation in the air passages of your lungs  This irritation may cause you to cough or have other breathing problems  Acute bronchitis often starts because of another illness, such as a cold or the flu  The illness spreads from your nose and throat to your windpipe and airways  Bronchitis is often called a chest cold  Acute bronchitis lasts about 3 to 6 weeks and is usually not a serious illness  Your cough can last for several weeks  DISCHARGE INSTRUCTIONS:   Return to the emergency department if:   · You cough up blood  · Your lips or fingernails turn blue  · You feel like you are not getting enough air when you breathe  Contact your healthcare provider if:   · You have a fever  · Your breathing problems do not go away or get worse  · Your cough does not get better within 4 weeks  · You have questions or concerns about your condition or care  Self-care:   · Get more rest   Rest helps your body to heal  Slowly start to do more each day  Rest when you feel it is needed  · Avoid irritants in the air  Avoid chemicals, fumes, and dust  Wear a face mask if you must work around dust or fumes  Stay inside on days when air pollution levels are high  If you have allergies, stay inside when pollen counts are high  Do not use aerosol products, such as spray-on deodorant, bug spray, and hair spray  · Do not smoke or be around others who smoke  Nicotine and other chemicals in cigarettes and cigars damages the cilia that move mucus out of your lungs  Ask your healthcare provider for information if you currently smoke and need help to quit  E-cigarettes or smokeless tobacco still contain nicotine  Talk to your healthcare provider before you use these products  · Drink liquids as directed  Liquids help keep your air passages moist and help you cough up mucus   You may need to drink more liquids when you have acute bronchitis  Ask how much liquid to drink each day and which liquids are best for you  · Use a humidifier or vaporizer  Use a cool mist humidifier or a vaporizer to increase air moisture in your home  This may make it easier for you to breathe and help decrease your cough  Decrease risk for acute bronchitis:   · Get the vaccinations you need  Ask your healthcare provider if you should get vaccinated against the flu or pneumonia  · Prevent the spread of germs  You can decrease your risk of acute bronchitis and other illnesses by doing the following:     [facility] your hands often with soap and water  Carry germ-killing hand lotion or gel with you  You can use the lotion or gel to clean your hands when soap and water are not available  ¨ Do not touch your eyes, nose, or mouth unless you have washed your hands first     ¨ Always cover your mouth when you cough to prevent the spread of germs  It is best to cough into a tissue or your shirt sleeve instead of into your hand  Ask those around you cover their mouths when they cough  ¨ Try to avoid people who have a cold or the flu  If you are sick, stay away from others as much as possible  Medicines: Your healthcare provider may  give you any of the following:  · Ibuprofen or acetaminophen  are medicines that help lower your fever  They are available without a doctor's order  Ask your healthcare provider which medicine is right for you  Ask how much to take and how often to take it  Follow directions  These medicines can cause stomach bleeding if not taken correctly  Ibuprofen can cause kidney damage  Do not take ibuprofen if you have kidney disease, an ulcer, or allergies to aspirin  Acetaminophen can cause liver damage  Do not take more than 4,000 milligrams in 24 hours  · Decongestants  help loosen mucus in your lungs and make it easier to cough up  This can help you breathe easier  · Cough suppressants  decrease your urge to cough   If your cough produces mucus, do not take a cough suppressant unless your healthcare provider tells you to  Your healthcare provider may suggest that you take a cough suppressant at night so you can rest     · Inhalers  may be given  Your healthcare provider may give you one or more inhalers to help you breathe easier and cough less  An inhaler gives your medicine to open your airways  Ask your healthcare provider to show you how to use your inhaler correctly  · Take your medicine as directed  Contact your healthcare provider if you think your medicine is not helping or if you have side effects  Tell him of her if you are allergic to any medicine  Keep a list of the medicines, vitamins, and herbs you take  Include the amounts, and when and why you take them  Bring the list or the pill bottles to follow-up visits  Carry your medicine list with you in case of an emergency  Follow up with your healthcare provider as directed:  Write down questions you have so you will remember to ask them during your follow-up visits  © 2017 [facility] [facility] Information is for End User's use only and may not be sold, redistributed or otherwise used for commercial purposes  All illustrations and images included in CareNotes® are the copyrighted property of A D A M [employer] Inc  or [employer]  The above information is an  only  It is not intended as medical advice for individual conditions or treatments  Talk to your doctor, nurse or pharmacist before following any medical regimen to see if it is safe and effective for you  Thoracic Back Strain   WHAT YOU NEED TO KNOW:   A thoracic back strain is a muscle or tendon injury in your upper or middle back  You may have pain, muscle spasms, swelling, or stiffness  The strain may be caused by a back injury, poor posture, or lifting a heavy object  Too much activity can also cause a strain   A mild strain may cause minor pain that goes away in a few days  A more severe strain may cause the muscle or tendon to tear  There is a very small chance you may need surgery to fix the tear  DISCHARGE INSTRUCTIONS:   Medicines: You may need any of the following:  · Prescription pain medicine  may be given  Ask how to take this medicine safely  Do not wait until the pain is severe to take your medicine  · NSAIDs , such as ibuprofen, help decrease swelling, pain, and fever  This medicine is available with or without a doctor's order  NSAIDs can cause stomach bleeding or kidney problems in certain people  If you take blood thinner medicine, always ask your healthcare provider if NSAIDs are safe for you  Always read the medicine label and follow directions  · Muscle relaxers  help prevent or treat spasms  · Take your medicine as directed  Contact your healthcare provider if you think your medicine is not helping or if you have side effects  Tell him or her if you are allergic to any medicine  Keep a list of the medicines, vitamins, and herbs you take  Include the amounts, and when and why you take them  Bring the list or the pill bottles to follow-up visits  Carry your medicine list with you in case of an emergency  Call 911 for any of the following:   · You have chest pain or shortness of breath  Return to the emergency department if:   · You have severe pain, or pain that spreads from your back to other areas  · You have new or increased swelling or redness in the injured area  Contact your healthcare provider if:   · You have questions or concerns about your condition or care  Follow up with your healthcare provider as directed: You may need more tests to check for other injuries or to see how your injury is healing  You may also need to see a specialist  Write down your questions so you remember to ask them during your visits  Self-care:   · Rest as directed  Move slowly and carefully  Do not lift heavy objects       · Apply ice or heat as directed  Ice decreases pain and swelling and may help decrease tissue damage  Heat helps decrease muscle spasms  Your healthcare provider may tell you to apply only ice for the first 24 hours to help reduce swelling  Apply ice or heat to the area for 20 minutes every hour, or as directed  Ask how many times to do this each day, and for how many days  · Use an elastic wrap or back brace as directed  These will help keep the injured area from moving so it can heal      · Go to physical therapy as directed  A physical therapist can teach you exercises to help strengthen your back  They can also teach you safe ways to bend and move so you do not cause more injury  Prevent another thoracic back strain:   · Lift objects carefully  Ask someone to help you lift heavy objects  If you must lift an object by yourself, do not use your back muscles to lift  Lift with your legs  · Check your posture  Keep your upper body lifted and your head up  Poor posture can cause back strain or make it worse  Adjust your position if you work in front of a computer  You may need arm or wrist supports or change the height of your chair  · Exercise as directed  Exercise can help strengthen your muscles and make you more flexible  Do not exercise or play sports when you are tired  Always warm up before you start and cool down when you finish  · Stretch your muscles as directed  Keep your muscles limber by stretching every day  Stretch before you exercise  © 2017 [address] Information is for End User's use only and may not be sold, redistributed or otherwise used for commercial purposes  All illustrations and images included in CareNotes® are the copyrighted property of A [employer] M [employer] Inc  or Christiano Davila  The above information is an  only  It is not intended as medical advice for individual conditions or treatments   Talk to your doctor, nurse or pharmacist before following any medical regimen to see if it is safe and effective for you  Flank Pain   WHAT YOU NEED TO KNOW:   Flank pain is felt in the area below your ribcage and above your hip bones, often in the lower back  Your pain may be dull or so severe that you cannot get comfortable  The pain may stay in one area or radiate to another area  It may worsen and lighten in waves  Flank pain is often a sign of problems with your urinary tract, such as a kidney stone or infection  DISCHARGE INSTRUCTIONS:   Return to the emergency department if:   · You have a fever  · Your heart is fluttering or jumping  · You see blood in your urine  · Your pain radiates into your lower abdomen and genital area  · You have intense pain in your low back next to your spine  · You are much more tired than usual and have no desire to eat  · You have a headache and your muscles jerk  Contact your healthcare provider if:   · You have an upset stomach and are vomiting  · You have to urinate more often, and with urgency  · Your pain worsens or does not improve, and you cannot get comfortable  · You pass a stone when you urinate  · You have questions or concerns about your condition or care  Medicines: The following medicines may be ordered for you:  · Pain medicine  may help decrease or relieve your pain  Do not wait until the pain is severe before you take your medicine  · Antibiotics  may help treat a urinary tract infection caused by bacteria  · Take your medicine as directed  Contact your healthcare provider if you think your medicine is not helping or if you have side effects  Tell him of her if you are allergic to any medicine  Keep a list of the medicines, vitamins, and herbs you take  Include the amounts, and when and why you take them  Bring the list or the pill bottles to follow-up visits  Carry your medicine list with you in case of an emergency    Follow up with your healthcare provider in 1 to 2 days or as directed:  Write down your questions so you remember to ask them during your visits  © 2017 [address] Information is for End User's use only and may not be sold, redistributed or otherwise used for commercial purposes  All illustrations and images included in CareNotes® are the copyrighted property of A [employer] A M [employer] Inc  or Christiano Davila  The above information is an  only  It is not intended as medical advice for individual conditions or treatments  Talk to your doctor, nurse or pharmacist before following any medical regimen to see if it is safe and effective for you

## 2018-12-17 LAB — BACTERIA UR CULT: ABNORMAL

## 2018-12-18 PROBLEM — N39.0 UTI (URINARY TRACT INFECTION): Status: ACTIVE | Noted: 2018-12-18

## 2018-12-18 LAB
ATRIAL RATE: 73 BPM
P AXIS: 35 DEGREES
PR INTERVAL: 118 MS
QRS AXIS: 65 DEGREES
QRSD INTERVAL: 84 MS
QT INTERVAL: 380 MS
QTC INTERVAL: 418 MS
T WAVE AXIS: 42 DEGREES
VENTRICULAR RATE: 73 BPM

## 2018-12-18 PROCEDURE — 93010 ELECTROCARDIOGRAM REPORT: CPT | Performed by: INTERNAL MEDICINE

## 2018-12-18 RX ORDER — FLUCONAZOLE 200 MG/1
200 TABLET ORAL DAILY
Qty: 1 TABLET | Refills: 0 | Status: SHIPPED | OUTPATIENT
Start: 2018-12-18 | End: 2018-12-19

## 2018-12-18 RX ORDER — CEPHALEXIN 500 MG/1
500 CAPSULE ORAL 4 TIMES DAILY
Qty: 20 CAPSULE | Refills: 0 | Status: SHIPPED | OUTPATIENT
Start: 2018-12-18 | End: 2018-12-23

## 2019-01-23 ENCOUNTER — TELEPHONE (OUTPATIENT)
Dept: INTERNAL MEDICINE CLINIC | Facility: CLINIC | Age: 54
End: 2019-01-23

## 2019-01-23 NOTE — TELEPHONE ENCOUNTER
rec'ed medical record request / authorization for release of information  Medical records have been faxed & confirmed received

## 2019-02-11 RX ORDER — HYDROXYZINE 50 MG/1
TABLET, FILM COATED ORAL
COMMUNITY
End: 2019-02-14

## 2019-02-11 RX ORDER — BUSPIRONE HYDROCHLORIDE 5 MG/1
5 TABLET ORAL
COMMUNITY
Start: 2015-09-01 | End: 2019-02-14

## 2019-02-14 ENCOUNTER — APPOINTMENT (OUTPATIENT)
Dept: LAB | Facility: CLINIC | Age: 54
End: 2019-02-14
Payer: COMMERCIAL

## 2019-02-14 ENCOUNTER — OFFICE VISIT (OUTPATIENT)
Dept: INTERNAL MEDICINE CLINIC | Facility: CLINIC | Age: 54
End: 2019-02-14
Payer: COMMERCIAL

## 2019-02-14 VITALS
SYSTOLIC BLOOD PRESSURE: 108 MMHG | WEIGHT: 139.8 LBS | HEART RATE: 76 BPM | RESPIRATION RATE: 16 BRPM | DIASTOLIC BLOOD PRESSURE: 62 MMHG | BODY MASS INDEX: 21.94 KG/M2 | HEIGHT: 67 IN

## 2019-02-14 DIAGNOSIS — E78.49 OTHER HYPERLIPIDEMIA: ICD-10-CM

## 2019-02-14 DIAGNOSIS — I10 ESSENTIAL HYPERTENSION: ICD-10-CM

## 2019-02-14 DIAGNOSIS — R73.01 IMPAIRED FASTING GLUCOSE: ICD-10-CM

## 2019-02-14 DIAGNOSIS — Z12.11 SCREENING FOR COLON CANCER: ICD-10-CM

## 2019-02-14 DIAGNOSIS — Z12.4 SCREENING FOR CERVICAL CANCER: ICD-10-CM

## 2019-02-14 DIAGNOSIS — T65.294S TOXIC EFFECT OF TOBACCO, UNDETERMINED INTENT, SEQUELA: ICD-10-CM

## 2019-02-14 DIAGNOSIS — F31.60 BIPOLAR AFFECTIVE DISORDER, CURRENT EPISODE MIXED, CURRENT EPISODE SEVERITY UNSPECIFIED (HCC): ICD-10-CM

## 2019-02-14 DIAGNOSIS — J44.1 COPD WITH EXACERBATION (HCC): ICD-10-CM

## 2019-02-14 DIAGNOSIS — Z12.39 SCREENING FOR BREAST CANCER: Primary | ICD-10-CM

## 2019-02-14 DIAGNOSIS — N39.0 URINARY TRACT INFECTION WITHOUT HEMATURIA, SITE UNSPECIFIED: ICD-10-CM

## 2019-02-14 LAB
ALBUMIN SERPL BCP-MCNC: 3.9 G/DL (ref 3.5–5)
ALP SERPL-CCNC: 81 U/L (ref 46–116)
ALT SERPL W P-5'-P-CCNC: 17 U/L (ref 12–78)
ANION GAP SERPL CALCULATED.3IONS-SCNC: 3 MMOL/L (ref 4–13)
AST SERPL W P-5'-P-CCNC: 16 U/L (ref 5–45)
BASOPHILS # BLD AUTO: 0.04 THOUSANDS/ΜL (ref 0–0.1)
BASOPHILS NFR BLD AUTO: 1 % (ref 0–1)
BILIRUB SERPL-MCNC: 0.22 MG/DL (ref 0.2–1)
BUN SERPL-MCNC: 11 MG/DL (ref 5–25)
CALCIUM SERPL-MCNC: 9 MG/DL (ref 8.3–10.1)
CHLORIDE SERPL-SCNC: 104 MMOL/L (ref 100–108)
CHOLEST SERPL-MCNC: 228 MG/DL (ref 50–200)
CO2 SERPL-SCNC: 30 MMOL/L (ref 21–32)
CREAT SERPL-MCNC: 0.67 MG/DL (ref 0.6–1.3)
EOSINOPHIL # BLD AUTO: 0.27 THOUSAND/ΜL (ref 0–0.61)
EOSINOPHIL NFR BLD AUTO: 4 % (ref 0–6)
ERYTHROCYTE [DISTWIDTH] IN BLOOD BY AUTOMATED COUNT: 14.9 % (ref 11.6–15.1)
EST. AVERAGE GLUCOSE BLD GHB EST-MCNC: 128 MG/DL
GFR SERPL CREATININE-BSD FRML MDRD: 102 ML/MIN/1.73SQ M
GLUCOSE SERPL-MCNC: 74 MG/DL (ref 65–140)
HBA1C MFR BLD: 6.1 % (ref 4.2–6.3)
HCT VFR BLD AUTO: 44.7 % (ref 34.8–46.1)
HDLC SERPL-MCNC: 52 MG/DL (ref 40–60)
HGB BLD-MCNC: 14.5 G/DL (ref 11.5–15.4)
IMM GRANULOCYTES # BLD AUTO: 0.01 THOUSAND/UL (ref 0–0.2)
IMM GRANULOCYTES NFR BLD AUTO: 0 % (ref 0–2)
LDLC SERPL CALC-MCNC: 145 MG/DL (ref 0–100)
LYMPHOCYTES # BLD AUTO: 2.19 THOUSANDS/ΜL (ref 0.6–4.47)
LYMPHOCYTES NFR BLD AUTO: 36 % (ref 14–44)
MCH RBC QN AUTO: 29.1 PG (ref 26.8–34.3)
MCHC RBC AUTO-ENTMCNC: 32.4 G/DL (ref 31.4–37.4)
MCV RBC AUTO: 90 FL (ref 82–98)
MONOCYTES # BLD AUTO: 0.38 THOUSAND/ΜL (ref 0.17–1.22)
MONOCYTES NFR BLD AUTO: 6 % (ref 4–12)
NEUTROPHILS # BLD AUTO: 3.21 THOUSANDS/ΜL (ref 1.85–7.62)
NEUTS SEG NFR BLD AUTO: 53 % (ref 43–75)
NONHDLC SERPL-MCNC: 176 MG/DL
NRBC BLD AUTO-RTO: 0 /100 WBCS
PLATELET # BLD AUTO: 327 THOUSANDS/UL (ref 149–390)
PMV BLD AUTO: 10.5 FL (ref 8.9–12.7)
POTASSIUM SERPL-SCNC: 3.9 MMOL/L (ref 3.5–5.3)
PROT SERPL-MCNC: 7.3 G/DL (ref 6.4–8.2)
RBC # BLD AUTO: 4.98 MILLION/UL (ref 3.81–5.12)
SODIUM SERPL-SCNC: 137 MMOL/L (ref 136–145)
TRIGL SERPL-MCNC: 157 MG/DL
TSH SERPL DL<=0.05 MIU/L-ACNC: 0.9 UIU/ML (ref 0.36–3.74)
WBC # BLD AUTO: 6.1 THOUSAND/UL (ref 4.31–10.16)

## 2019-02-14 PROCEDURE — 85025 COMPLETE CBC W/AUTO DIFF WBC: CPT

## 2019-02-14 PROCEDURE — 99214 OFFICE O/P EST MOD 30 MIN: CPT | Performed by: NURSE PRACTITIONER

## 2019-02-14 PROCEDURE — 3074F SYST BP LT 130 MM HG: CPT | Performed by: NURSE PRACTITIONER

## 2019-02-14 PROCEDURE — 3078F DIAST BP <80 MM HG: CPT | Performed by: NURSE PRACTITIONER

## 2019-02-14 PROCEDURE — 80053 COMPREHEN METABOLIC PANEL: CPT

## 2019-02-14 PROCEDURE — 80061 LIPID PANEL: CPT

## 2019-02-14 PROCEDURE — 4004F PT TOBACCO SCREEN RCVD TLK: CPT | Performed by: NURSE PRACTITIONER

## 2019-02-14 PROCEDURE — 84443 ASSAY THYROID STIM HORMONE: CPT

## 2019-02-14 PROCEDURE — 36415 COLL VENOUS BLD VENIPUNCTURE: CPT

## 2019-02-14 PROCEDURE — 3008F BODY MASS INDEX DOCD: CPT | Performed by: NURSE PRACTITIONER

## 2019-02-14 PROCEDURE — 83036 HEMOGLOBIN GLYCOSYLATED A1C: CPT

## 2019-02-14 RX ORDER — ALBUTEROL SULFATE 90 UG/1
2 AEROSOL, METERED RESPIRATORY (INHALATION) EVERY 4 HOURS PRN
Qty: 3 INHALER | Refills: 3 | Status: SHIPPED | OUTPATIENT
Start: 2019-02-14 | End: 2019-05-15

## 2019-02-14 RX ORDER — ALBUTEROL SULFATE 2.5 MG/3ML
2.5 SOLUTION RESPIRATORY (INHALATION) EVERY 6 HOURS PRN
Qty: 75 ML | Refills: 3 | Status: SHIPPED | OUTPATIENT
Start: 2019-02-14

## 2019-02-14 NOTE — PROGRESS NOTES
Assessment/Plan:    Patient Instructions   History of impaired fasting glucose check A1c    History of hyperlipidemia check lipid profile follow-up any abnormal    Bipolar stable on current medication following with [facility]    Tobacco abuse cessation strongly encouraged    Health maintenance refer to GI for colonoscopy, prescription for mammogram given, she already had the flu this season, refer to gynecology for Pap         Diagnoses and all orders for this visit:    Screening for breast cancer  -     Mammo screening bilateral w 3d & cad; Future    Screening for cervical cancer  -     Ambulatory referral to Obstetrics / Gynecology; Future    Toxic effect of tobacco, undetermined intent, sequela  -     albuterol (PROAIR HFA) 90 mcg/act inhaler; Inhale 2 puffs every 4 (four) hours as needed for wheezing for up to 90 days  -     albuterol (2 5 mg/3 mL) 0 083 % nebulizer solution; Take 1 vial (2 5 mg total) by nebulization every 6 (six) hours as needed for wheezing or shortness of breath    Essential hypertension  -     CBC and differential; Future    Other hyperlipidemia  -     Comprehensive metabolic panel; Future  -     Lipid panel; Future  -     TSH, 3rd generation with Free T4 reflex; Future    Impaired fasting glucose  -     Hemoglobin A1C; Future    COPD with exacerbation (HCC)    Urinary tract infection without hematuria, site unspecified    Bipolar affective disorder, current episode mixed, [facility] )    Screening for colon cancer  -     Ambulatory referral to Gastroenterology; Future    Other orders  -     Discontinue: busPIRone (BUSPAR) 5 mg tablet;  Take 5 mg by mouth  -     Discontinue: hydrOXYzine HCL (ATARAX) 50 mg tablet  -     Discontinue: Lurasidone HCl (LATUDA) 60 MG TABS         Subjective:      Patient ID: Torrey Gross is a 48 y o  female    Patient has been seen about a year  She was using methamphetamines and at doing some time in rehab and then went to [facility] for 4 months  She is now out she is clean she is doing well  She has a fiancee    She has perfuse pursuing disability secondary to meal in the [facility] and her bipolar  She has been stable on her meds for about a year she is following with [facility]  She does continue to smoke about a pack a day  Needs gynecology and mammogram never had colonoscopy yet        Current Outpatient Medications:     albuterol (PROAIR HFA) 90 mcg/act inhaler, Inhale 2 puffs every 4 (four) hours as needed for wheezing for up to 90 days, Disp: 3 Inhaler, Rfl: 3    carBAMazepine (TEGretol) 200 mg tablet, Take 1 tablet (200 mg total) by mouth 2 (two) times a day, Disp: 60 tablet, Rfl: 0    ibuprofen (MOTRIN) 800 mg tablet, Take by mouth, Disp: , Rfl:     naproxen (NAPROSYN) 500 mg tablet, Take 1 tablet (500 mg total) by mouth every 12 (twelve) hours as needed for mild pain or moderate pain, Disp: 20 tablet, Rfl: 0    risperiDONE (RisperDAL) 1 mg tablet, Take 1 tablet (1 mg total) by mouth daily at bedtime, Disp: 6 tablet, Rfl: 0    albuterol (2 5 mg/3 mL) 0 083 % nebulizer solution, Take 1 vial (2 5 mg total) by nebulization every 6 (six) hours as needed for wheezing or shortness of breath, Disp: 75 mL, Rfl: 3    Recent Results (from the past 1008 hour(s))   CBC and differential    Collection Time: 02/14/19  2:59 PM   Result Value Ref Range    WBC 6 10 4 31 - 10 16 Thousand/uL    RBC 4 98 3 81 - 5 12 Million/uL    Hemoglobin 14 5 11 5 - 15 4 g/dL    Hematocrit 44 7 34 8 - 46 1 %    MCV 90 82 - 98 fL    MCH 29 1 26 8 - 34 3 pg    MCHC 32 4 31 4 - 37 4 g/dL    RDW 14 9 11 6 - 15 1 %    MPV 10 5 8 9 - 12 7 fL    Platelets 073 699 - 314 Thousands/uL    nRBC 0 /100 WBCs    Neutrophils Relative 53 43 - 75 %    Immat GRANS % 0 0 - 2 %    Lymphocytes Relative 36 14 - 44 %    Monocytes Relative 6 4 - 12 %    Eosinophils Relative 4 0 - 6 %    Basophils Relative 1 0 - 1 %    Neutrophils Absolute 3 21 1 85 - 7 62 Thousands/µL    Immature Grans Absolute 0  01 0 00 - 0 20 Thousand/uL    Lymphocytes Absolute 2 19 0 60 - 4 47 Thousands/µL    Monocytes Absolute 0 38 0 17 - 1 22 Thousand/µL    Eosinophils Absolute 0 27 0 00 - 0 61 Thousand/µL    Basophils Absolute 0 04 0 00 - 0 10 Thousands/µL   Comprehensive metabolic panel    Collection Time: 02/14/19  2:59 PM   Result Value Ref Range    Sodium 137 136 - 145 mmol/L    Potassium 3 9 3 5 - 5 3 mmol/L    Chloride 104 100 - 108 mmol/L    CO2 30 21 - 32 mmol/L    ANION GAP 3 (L) 4 - 13 mmol/L    BUN 11 5 - 25 mg/dL    Creatinine 0 67 0 60 - 1 30 mg/dL    Glucose 74 65 - 140 mg/dL    Calcium 9 0 8 3 - 10 1 mg/dL    AST 16 5 - 45 U/L    ALT 17 12 - 78 U/L    Alkaline Phosphatase 81 46 - 116 U/L    Total Protein 7 3 6 4 - 8 2 g/dL    Albumin 3 9 3 5 - 5 0 g/dL    Total Bilirubin 0 22 0 20 - 1 00 mg/dL    eGFR 102 ml/min/1 73sq m   Lipid panel    Collection Time: 02/14/19  2:59 PM   Result Value Ref Range    Cholesterol 228 (H) 50 - 200 mg/dL    Triglycerides 157 (H) <=150 mg/dL    HDL, Direct 52 40 - 60 mg/dL    LDL Calculated 145 (H) 0 - 100 mg/dL    Non-HDL-Chol (CHOL-HDL) 176 mg/dl   Hemoglobin A1C    Collection Time: 02/14/19  2:59 PM   Result Value Ref Range    Hemoglobin A1C 6 1 4 2 - 6 3 %     mg/dl   TSH, 3rd generation with Free T4 reflex    Collection Time: 02/14/19  2:59 PM   Result Value Ref Range    TSH 3RD GENERATON 0 898 0 358 - 3 740 uIU/mL       The following portions of the patient's history were reviewed and updated as appropriate: allergies, current medications, past family history, past medical history, past social history, past surgical history and problem list      Review of Systems   Constitutional: Negative for appetite change, chills, diaphoresis, fatigue, fever and unexpected weight change  HENT: Negative for postnasal drip and sneezing  Eyes: Negative for visual disturbance  Respiratory: Negative for chest tightness and shortness of breath      Cardiovascular: Negative for chest pain, palpitations and leg swelling  Gastrointestinal: Negative for abdominal pain and blood in stool  Endocrine: Negative for cold intolerance, heat intolerance, polydipsia, polyphagia and polyuria  Genitourinary: Negative for difficulty urinating, dysuria, frequency and urgency  Musculoskeletal: Negative for arthralgias and myalgias  Skin: Negative for rash and wound  Neurological: Negative for dizziness, weakness, light-headedness and headaches  Hematological: Negative for adenopathy  Psychiatric/Behavioral: Negative for confusion, dysphoric mood and sleep disturbance  The patient is not nervous/anxious  Objective:      /62 (BP Location: Left arm, Patient Position: Sitting, Cuff Size: Standard)   Pulse 76   Resp 16   Ht 5' 7" (1 702 m)   Wt 63 4 kg (139 lb 12 8 oz)   BMI 21 90 kg/m²        Physical Exam   Constitutional: She is oriented to person, place, and time  She appears well-developed  No distress  HENT:   Head: Normocephalic and atraumatic  Nose: Nose normal    Mouth/Throat: Oropharynx is clear and moist    Eyes: Pupils are equal, round, and reactive to light  Conjunctivae and EOM are normal    Neck: Normal range of motion  Neck supple  No JVD present  No tracheal deviation present  No thyromegaly present  Cardiovascular: Normal rate, regular rhythm, normal heart sounds and intact distal pulses  Exam reveals no gallop and no friction rub  No murmur heard  Pulmonary/Chest: Effort normal and breath sounds normal  No respiratory distress  She has no wheezes  She has no rales  Abdominal: Soft  Bowel sounds are normal  She exhibits no distension  There is no tenderness  Musculoskeletal: Normal range of motion  She exhibits no edema  Lymphadenopathy:     She has no cervical adenopathy  Neurological: She is alert and oriented to person, place, and time  No cranial nerve deficit  Skin: Skin is warm and dry  No rash noted  She is not diaphoretic     Psychiatric: She has a normal mood and affect   Her behavior is normal  Judgment and thought content normal

## 2019-02-14 NOTE — PATIENT INSTRUCTIONS
History of impaired fasting glucose check A1c    History of hyperlipidemia check lipid profile follow-up any abnormal    Bipolar stable on current medication following with [facility]    Tobacco abuse cessation strongly encouraged    Health maintenance refer to GI for colonoscopy, prescription for mammogram given, she already had the flu this season, refer to gynecology for Pap

## 2019-02-15 ENCOUNTER — TELEPHONE (OUTPATIENT)
Dept: INTERNAL MEDICINE CLINIC | Facility: CLINIC | Age: 54
End: 2019-02-15

## 2019-02-15 NOTE — TELEPHONE ENCOUNTER
----- Message from Nery Loja, [address] sent at 2/15/2019  1:32 PM EST -----  Notify her cholesterol was a little high so I would recommend a low-cholesterol diet, and her sugar number was a little high as well which puts her in a prediabetic category so I really would cut out the starches including bread pasta rice potatoes cookies cupcakes pretzels potato chips etc she does not need to eliminate them but she should cut back on them

## 2020-06-17 ENCOUNTER — HOSPITAL ENCOUNTER (EMERGENCY)
Age: 55
Discharge: STILL ADMITTED OR ADMINISTRATIVE | End: 2020-06-17

## 2020-10-01 ENCOUNTER — TELEPHONE (OUTPATIENT)
Dept: INTERNAL MEDICINE CLINIC | Facility: CLINIC | Age: 55
End: 2020-10-01

## 2020-10-07 NOTE — ED PROVIDER NOTES
History  Chief Complaint   Patient presents with    Back Pain     pt states that she started with back pain, mid left side about 5 days ago after moving things since then has also developed a cold      Patient is a 82-year-old female with past medical history of COPD, bipolar disorder, presents to the emergency department with multiple complaints  Patient reports that 5 days ago she started having left mid back/flank pain that she describes as a constant throbbing and aching pain  She denies any radiation of the pain and denies any alleviating or aggravating factors  She reports prior to the pain starting she was lifting heavy boxes  Patient also reports since this pain has started she also has noticed increased urinary urgency and frequency but only small amounts coming out at a time  Denies associated dysuria, foul-smelling urine or gross hematuria  She also states for the past 3-4 days she has had productive cough, subjective fever and chills and progressive dyspnea  She also reports within the past 1 week having 1 episode of nonbilious nonbloody vomiting and intermittent constipation  She denies headache, dizziness or near syncope, visual disturbance or eye pain, runny nose, congestion, sore throat, neck pain or stiffness, earache, chest pain, palpitations, wheezing or stridor, abdominal pain or distention, nausea currently, diarrhea, blood per rectum or melena, extremity pain or swelling, extremity weakness or paresthesia or other focal neurologic deficits  She is a current chronic tobacco smoker  History provided by:  Patient   used: No    Back Pain   Associated symptoms: fever    Associated symptoms: no abdominal pain, no chest pain, no dysuria, no headaches, no numbness and no weakness        Prior to Admission Medications   Prescriptions Last Dose Informant Patient Reported? Taking?    albuterol (2 5 mg/3 mL) 0 083 % nebulizer solution  Self Yes Yes   Sig: Inhale 1 each albuterol (PROAIR HFA) 90 mcg/act inhaler  Self Yes Yes   Sig: Inhale 2 puffs every 4 (four) hours as needed   carBAMazepine (TEGretol) 200 mg tablet   No Yes   Sig: Take 1 tablet (200 mg total) by mouth 2 (two) times a day   ibuprofen (MOTRIN) 800 mg tablet  Self Yes Yes   Sig: Take by mouth   risperiDONE (RisperDAL) 1 mg tablet  Self No Yes   Sig: Take 1 tablet (1 mg total) by mouth daily at bedtime      Facility-Administered Medications: None       Past Medical History:   Diagnosis Date    Bipolar disorder ([facility] [address] )     LAST ASSESSED [id number]       Past Surgical History:   Procedure Laterality Date    CYSTOSCOPY      DIAGNOSTIC   8/28/2014 DR Geovanny Stapleton 12/17/2015    DILATION AND CURETTAGE OF UTERUS      X2       Family History   Problem Relation Age of Onset    COPD Mother     Seizures Mother     Cancer Father      I have reviewed and agree with the history as documented  Social History   Substance Use Topics    Smoking status: Current Every Day Smoker     Packs/day: 0 50     Types: Cigarettes    Smokeless tobacco: Never Used    Alcohol use No        Review of Systems   Constitutional: Positive for appetite change, chills and fever  HENT: Negative for congestion, ear pain, rhinorrhea and sore throat  Eyes: Negative for photophobia, pain and visual disturbance  Respiratory: Positive for cough and shortness of breath  Negative for chest tightness and wheezing  Cardiovascular: Negative for chest pain, palpitations and leg swelling  Gastrointestinal: Positive for constipation and vomiting  Negative for abdominal distention, abdominal pain, blood in stool, diarrhea and nausea  Genitourinary: Positive for flank pain, frequency and urgency  Negative for dysuria and hematuria  Musculoskeletal: Positive for back pain  Negative for neck pain and neck stiffness  Skin: Negative for color change, pallor and rash  Allergic/Immunologic: Negative for immunocompromised state     Neurological: Negative for dizziness, syncope, weakness, light-headedness, numbness and headaches  Hematological: Negative for adenopathy  Does not bruise/bleed easily  Psychiatric/Behavioral: Negative for confusion and decreased concentration  All other systems reviewed and are negative  Physical Exam  Physical Exam   Constitutional: She is oriented to person, place, and time  She appears well-developed and well-nourished  No distress  HENT:   Head: Normocephalic and atraumatic  Mouth/Throat: Oropharynx is clear and moist  No oropharyngeal exudate  Eyes: Pupils are equal, round, and reactive to light  Conjunctivae and EOM are normal    Neck: Normal range of motion  Neck supple  No JVD present  Cardiovascular: Normal rate, regular rhythm, normal heart sounds and intact distal pulses  Exam reveals no gallop and no friction rub  No murmur heard  Pulmonary/Chest: Effort normal and breath sounds normal  No respiratory distress  She has no wheezes  She has no rales  She exhibits no tenderness  Abdominal: Soft  Bowel sounds are normal  She exhibits no distension  There is no tenderness  There is no rebound and no guarding  No abdominal or CVA tenderness  Musculoskeletal: Normal range of motion  She exhibits no edema or tenderness  No midline spine tenderness  No significant thoracolumbar paravertebral muscle tenderness bilaterally  Mild left thoracic paravertebral hypertonicity  Lymphadenopathy:     She has no cervical adenopathy  Neurological: She is alert and oriented to person, place, and time  No cranial nerve deficit  No gross motor or sensory deficits  5/5 strength throughout  Skin: Skin is warm and dry  No rash noted  She is not diaphoretic  No erythema  No pallor  Psychiatric: She has a normal mood and affect  Her behavior is normal    Nursing note and vitals reviewed        Vital Signs  ED Triage Vitals [12/15/18 1223]   Temperature Pulse Respirations Blood Pressure SpO2   98 1 °F (36 7 °C) 93 16 126/64 97 %      Temp Source Heart Rate Source Patient Position - Orthostatic VS BP Location FiO2 (%)   Oral Monitor Sitting Right arm --      Pain Score       8         Vitals:    12/15/18 1226 12/15/18 1330 12/15/18 1415 12/15/18 1500   BP: 101/57 107/58 99/58 101/59   BP Location: Right arm Left arm Left arm Left arm   Pulse:  83 78 77   Resp:  18 20 20   Temp:       TempSrc:       SpO2:  93% 95% 92%     Visual Acuity      ED Medications  Medications   lidocaine (LIDODERM) 5 % patch 1 patch (1 patch Topical Medication Applied 12/15/18 1304)   albuterol (PROVENTIL HFA,VENTOLIN HFA) inhaler 2 puff (not administered)   sodium chloride 0 9 % bolus 1,000 mL (0 mL Intravenous Stopped 12/15/18 1516)   ketorolac (TORADOL) injection 15 mg (15 mg Intravenous Given 12/15/18 1303)   baclofen tablet 10 mg (10 mg Oral Given 12/15/18 1322)   albuterol inhalation solution 5 mg (5 mg Nebulization Given 12/15/18 1314)   ipratropium (ATROVENT) 0 02 % inhalation solution 0 5 mg (0 5 mg Nebulization Given 12/15/18 1314)   predniSONE tablet 60 mg (60 mg Oral Given 12/15/18 1302)   iohexol (OMNIPAQUE) 350 MG/ML injection (MULTI-DOSE) 100 mL (100 mL Intravenous Given 12/15/18 1353)       Diagnostic Studies  Results Reviewed     Procedure Component Value Units Date/Time    POCT pregnancy, urine [[id number]]  (Normal) Resulted:  12/15/18 1315    Lab Status:  Final result Updated:  12/15/18 1420     EXT PREG TEST UR (Ref: Negative) Negative    Urine Microscopic [[id number]]  (Abnormal) Collected:  12/15/18 1313    Lab Status:  Final result Specimen:  Urine from Urine, Clean Catch Updated:  12/15/18 1343     RBC, UA 4-10 (A) /hpf      WBC, UA 1-2 (A) /hpf      Epithelial Cells Innumerable (A) /hpf      Bacteria, UA Moderate (A) /hpf      Hyaline Casts, UA 0-1 (A) /lpf     UA w Reflex to Microscopic [[id number]]  (Abnormal) Collected:  12/15/18 1313    Lab Status:  Final result Specimen:  Urine from Urine, Clean Catch Updated:  12/15/18 1338     Color, UA Yellow     Clarity, UA Cloudy     Specific Gravity, UA >=1 030     pH, UA 5 5     Leukocytes, UA Negative     Nitrite, UA Negative     Protein, UA 30 (1+) (A) mg/dl      Glucose, UA Negative mg/dl      Ketones, UA Trace (A) mg/dl      Urobilinogen, UA 0 2 E U /dl      Bilirubin, UA Negative     Blood, UA Moderate (A)    Troponin I [[id number]]  (Normal) Collected:  12/15/18 1305    Lab Status:  Final result Specimen:  Blood from Arm, Right Updated:  12/15/18 1331     Troponin I <0 02 ng/mL     Hepatic function panel [[id number]]  (Normal) Collected:  12/15/18 1305    Lab Status:  Final result Specimen:  Blood from Arm, Right Updated:  12/15/18 1328     Total Bilirubin 0 30 mg/dL      Bilirubin, Direct 0 07 mg/dL      Alkaline Phosphatase 77 U/L      AST 17 U/L      ALT 15 U/L      Total Protein 7 5 g/dL      Albumin 3 7 g/dL     Lipase [[id number]]  (Normal) Collected:  12/15/18 1305    Lab Status:  Final result Specimen:  Blood from Arm, Right Updated:  12/15/18 1328     Lipase 101 u/L     Basic metabolic panel [[id number]] Collected:  12/15/18 1305    Lab Status:  Final result Specimen:  Blood from Arm, Right Updated:  12/15/18 1328     Sodium 136 mmol/L      Potassium 3 8 mmol/L      Chloride 100 mmol/L      CO2 28 mmol/L      ANION GAP 8 mmol/L      BUN 12 mg/dL      Creatinine 0 88 mg/dL      Glucose 102 mg/dL      Calcium 8 9 mg/dL      eGFR 77 ml/min/1 73sq m     Narrative:         National Kidney Disease Education Program recommendations are as follows:  GFR calculation is accurate only with a steady state creatinine  Chronic Kidney disease less than 60 ml/min/1 73 sq  meters  Kidney failure less than 15 ml/min/1 73 sq  meters  Urine culture [[id number]] Collected:  12/15/18 1313    Lab Status:   In process Specimen:  Urine from Urine, Clean Catch Updated:  12/15/18 1326    CBC and differential [[id number]]  (Abnormal) Collected:  12/15/18 1305    Lab Status:  Final result Specimen:  Blood from Arm, Right Updated:  12/15/18 1312     WBC 3 77 (L) Thousand/uL      RBC 4 87 Million/uL      Hemoglobin 14 5 g/dL      Hematocrit 43 3 %      MCV 89 fL      MCH 29 8 pg      MCHC 33 5 g/dL      RDW 13 7 %      MPV 10 0 fL      Platelets 746 Thousands/uL      nRBC 0 /100 WBCs      Neutrophils Relative 66 %      Immat GRANS % 1 %      Lymphocytes Relative 21 %      Monocytes Relative 10 %      Eosinophils Relative 2 %      Basophils Relative 0 %      Neutrophils Absolute 2 52 Thousands/µL      Immature Grans Absolute 0 02 Thousand/uL      Lymphocytes Absolute 0 79 Thousands/µL      Monocytes Absolute 0 37 Thousand/µL      Eosinophils Absolute 0 06 Thousand/µL      Basophils Absolute 0 01 Thousands/µL                  CT abdomen pelvis with contrast   Final Result by Soy Clay MD (12/15 1510)      1  No acute abnormality in the abdomen or pelvis  2   3 mm nonobstructing calculus in the upper pole of the left kidney, previously measuring 1-2 mm  No hydronephrosis  Workstation performed: [id number]         XR chest 2 views   ED Interpretation by Smiley Castro DO (12/15 1322)   Questionable right middle lobe infiltrate                   Procedures  ECG 12 Lead Documentation  Date/Time: 12/15/2018 1:33 PM  Performed by: Janette Zuluaga  Authorized by: Janette Zuluaga     ECG reviewed by me, the ED Provider: yes    Patient location:  ED  Previous ECG:     Previous ECG:  Unavailable  Interpretation:     Interpretation: normal    Rate:     ECG rate:  73    ECG rate assessment: normal    Rhythm:     Rhythm: sinus rhythm    Ectopy:     Ectopy: none    QRS:     QRS axis:  Normal    QRS intervals:  Normal  Conduction:     Conduction: normal    ST segments:     ST segments:  Normal  T waves:     T waves: normal               Phone Contacts  ED Phone Contact    ED Course  ED Course as of Dec 15 1531   Sat Dec 15, 2018   1332 Troponin I: <0 02   1522 Updated patient about negative CT scan other than an incidental left kidney stone which is likely not causing her pain as it is still in the kidney  I do feel her pain is likely musculoskeletal in origin and less likely pyelonephritis as urinalysis is more consistent with contamination  Culture is pending  Will start patient on a Z-Samir for bronchitis and given inhaler to go home with  Will also give her 4 additional days of prednisone  Advised her to follow up with her PCP  Discussed ED return parameters  MDM  Number of Diagnoses or Management Options  Diagnosis management comments: 62-year-old female presents with 5 days of progressively worsening and constant left mid back/flank pain  She is also having vague urinary symptoms and this could represent acute UTI or pyelonephritis but most likely this is a muscle strain or spasm  Differential also includes renal colic secondary to stone, pancreatitis  Will obtain CT scan of the abdomen and pelvis for further evaluation  As far as her cough, likely this is viral bronchitis but will obtain chest x-ray to rule out pneumonia  Will give DuoNeb breathing treatment and start patient on a course of prednisone given history of smoking and COPD  Will give Toradol, Lidoderm patch and baclofen for symptomatic relief of back pain         Amount and/or Complexity of Data Reviewed  Clinical lab tests: ordered and reviewed  Tests in the radiology section of CPT®: reviewed and ordered  Tests in the medicine section of CPT®: ordered and reviewed  Independent visualization of images, tracings, or specimens: yes      CritCare Time    Disposition  Final diagnoses:   Acute bronchitis   Strain of mid-back, initial encounter     Time reflects when diagnosis was documented in both MDM as applicable and the Disposition within this note     Time User Action Codes Description Comment    12/15/2018  3:28 PM Trino Quintin E Add [J20 9] Acute bronchitis     12/15/2018  3:28 PM Trino [place] E Add [J56 [id number]] Strain of mid-back, initial encounter       ED Disposition     ED Disposition Condition Comment    Discharge  Trung Corey U  64  discharge to home/self care  Condition at discharge: Stable        Follow-up Information     Follow up With Specialties Details Why Contact Info Additional Information    Lyly Huynh MD Internal Medicine, Palliative Care Schedule an appointment as soon as possible for a visit  [address], [place] [address] Emergency Department Emergency Medicine Go to If symptoms worsen [address] [facility] [address]  [phone] [place] ED, [address] [place] [address]          Patient's Medications   Discharge Prescriptions    AZITHROMYCIN (ZITHROMAX Z-CURTIS) 250 MG TABLET    Take 2 tablets today then 1 tablet daily x 4 days       Start Date: 12/15/2018End Date: 12/19/2018       Order Dose: --       Quantity: 6 tablet    Refills: 0    BACLOFEN 10 MG TABLET    Take 1 tablet (10 mg total) by mouth 3 (three) times a day as needed for muscle spasms       Start Date: 12/15/2018End Date: --       Order Dose: 10 mg       Quantity: 10 tablet    Refills: 0    NAPROXEN (NAPROSYN) 500 MG TABLET    Take 1 tablet (500 mg total) by mouth every 12 (twelve) hours as needed for mild pain or moderate pain       Start Date: 12/15/2018End Date: --       Order Dose: 500 mg       Quantity: 20 tablet    Refills: 0    PREDNISONE 20 MG TABLET    Take 2 tablets (40 mg total) by mouth daily for 4 days       Start Date: 12/16/2018End Date: 12/20/2018       Order Dose: 40 mg       Quantity: 8 tablet    Refills: 0     No discharge procedures on file      ED Provider  Electronically Signed by           Ap Garner DO  12/15/18 0147 Alternatives Discussed Intro (Do Not Add Period): I discussed alternative treatments to Mohs surgery and specifically discussed the risks and benefits of

## 2020-11-05 ENCOUNTER — TELEPHONE (OUTPATIENT)
Dept: INTERNAL MEDICINE CLINIC | Facility: CLINIC | Age: 55
End: 2020-11-05

## 2021-05-03 ENCOUNTER — OFFICE VISIT (OUTPATIENT)
Dept: INTERNAL MEDICINE CLINIC | Facility: CLINIC | Age: 56
End: 2021-05-03
Payer: COMMERCIAL

## 2021-05-03 ENCOUNTER — APPOINTMENT (OUTPATIENT)
Dept: LAB | Facility: CLINIC | Age: 56
End: 2021-05-03
Payer: COMMERCIAL

## 2021-05-03 VITALS
HEART RATE: 85 BPM | DIASTOLIC BLOOD PRESSURE: 78 MMHG | BODY MASS INDEX: 24.17 KG/M2 | HEIGHT: 67 IN | TEMPERATURE: 97.9 F | OXYGEN SATURATION: 98 % | WEIGHT: 154 LBS | SYSTOLIC BLOOD PRESSURE: 110 MMHG

## 2021-05-03 DIAGNOSIS — Z11.4 SCREENING FOR HIV (HUMAN IMMUNODEFICIENCY VIRUS): ICD-10-CM

## 2021-05-03 DIAGNOSIS — R10.9 FLANK PAIN: Primary | ICD-10-CM

## 2021-05-03 DIAGNOSIS — Z12.11 SCREENING FOR COLORECTAL CANCER: ICD-10-CM

## 2021-05-03 DIAGNOSIS — Z12.12 SCREENING FOR COLORECTAL CANCER: ICD-10-CM

## 2021-05-03 DIAGNOSIS — K08.89 PAIN, DENTAL: ICD-10-CM

## 2021-05-03 PROCEDURE — 3008F BODY MASS INDEX DOCD: CPT | Performed by: FAMILY MEDICINE

## 2021-05-03 PROCEDURE — 99214 OFFICE O/P EST MOD 30 MIN: CPT | Performed by: FAMILY MEDICINE

## 2021-05-03 PROCEDURE — 81001 URINALYSIS AUTO W/SCOPE: CPT | Performed by: FAMILY MEDICINE

## 2021-05-03 PROCEDURE — 4004F PT TOBACCO SCREEN RCVD TLK: CPT | Performed by: FAMILY MEDICINE

## 2021-05-03 RX ORDER — AMOXICILLIN 500 MG/1
500 CAPSULE ORAL EVERY 8 HOURS SCHEDULED
Qty: 15 CAPSULE | Refills: 0 | Status: SHIPPED | OUTPATIENT
Start: 2021-05-03 | End: 2021-05-08

## 2021-05-03 RX ORDER — HYDROXYZINE PAMOATE 50 MG/1
50 CAPSULE ORAL 3 TIMES DAILY
COMMUNITY
Start: 2021-04-20

## 2021-05-03 NOTE — PROGRESS NOTES
FOLLOW-UP OFFICE VISIT  [facility]    NAME: Trudi Cotto  AGE: 50 y o  SEX: female  : 1965     DATE: 5/3/2021     Assessment and Plan:     Problem List Items Addressed This Visit     None      Visit Diagnoses     Flank pain    -  Primary    Relevant Orders    UA w Reflex to Microscopic w Reflex to Culture -Lab Collect    Screening for HIV (human immunodeficiency virus)        Screening for colorectal cancer        Pain, dental        Relevant Medications    amoxicillin (AMOXIL) 500 mg capsule        Plan;abx for dental infection  UA ordered for flank pain  Possibly related to nephrolithiasis  Pt encouraged to f/u with dentist         Return in about 3 months (around 8/3/2021) for Annual physical      Chief Complaint:     Chief Complaint   Patient presents with    tooth infection     started few weeks ago         History of Present Illness:       Reports tooth pain  Started bothering her again 2 days ago  First started 3 weeks ago while incarcerated  She was given amoxicillin and it resolved  Reports she has been eating a lot of candy and junk since being home  Report flank pain x 3 days  Right side  Feels like when she had a uti a while ago  Hurts when she rotates in certain directions  Denies dysuria, hematuria, frequency or urgency  Reports hx of nephrolithiasis  Denies fevers, chills, nausea or vomiting  Reports covid infection  while incarcerated        Review of Systems:     Per hpi      Problem List:     Patient Active Problem List   Diagnosis    B[facility] disorder ([facility] [address] )    COPD with exacerbation ([facility] [address] )    Impaired fasting glucose    Hyperlipidemia    Nephrolithiasis    Thyroid disorder    Toxic effect of tobacco    Undersocialized conduct disorder, aggressive type    UTI (urinary tract infection)        Objective:     /78 (BP Location: Left arm, Patient Position: Sitting) Comment: gdfgdfg  Pulse 85   Temp 97 9 °F (36 6 °C) (Tympanic) Comment: gfd  Ht 5' 7" (1 702 m)   Wt 69 9 kg (154 lb)   SpO2 98%   BMI 24 12 kg/m²     Physical Exam  HENT:      Head: Normocephalic and atraumatic  Mouth/Throat:      Mouth: Mucous membranes are moist       Dentition: Abnormal dentition  Dental tenderness and dental caries present  Comments:  completely decayed molar  Eyes:      Conjunctiva/sclera: Conjunctivae normal    Cardiovascular:      Rate and Rhythm: Normal rate and regular rhythm  Pulmonary:      Effort: Pulmonary effort is normal    Abdominal:      Tenderness: There is no right CVA tenderness or left CVA tenderness  Neurological:      Mental Status: She is alert and oriented to person, place, and time               Brandin TORIBIO HSPTLBelindsergio [facility] ([place], [place])  5/3/2021 5:12 PM

## 2021-05-04 ENCOUNTER — TELEPHONE (OUTPATIENT)
Dept: INTERNAL MEDICINE CLINIC | Facility: CLINIC | Age: 56
End: 2021-05-04

## 2021-05-04 LAB
BACTERIA UR QL AUTO: ABNORMAL /HPF
BILIRUB UR QL STRIP: NEGATIVE
CLARITY UR: CLEAR
COLOR UR: YELLOW
GLUCOSE UR STRIP-MCNC: NEGATIVE MG/DL
HGB UR QL STRIP.AUTO: ABNORMAL
HYALINE CASTS #/AREA URNS LPF: ABNORMAL /LPF
KETONES UR STRIP-MCNC: NEGATIVE MG/DL
LEUKOCYTE ESTERASE UR QL STRIP: NEGATIVE
NITRITE UR QL STRIP: NEGATIVE
NON-SQ EPI CELLS URNS QL MICRO: ABNORMAL /HPF
PH UR STRIP.AUTO: 5.5 [PH]
PROT UR STRIP-MCNC: NEGATIVE MG/DL
RBC #/AREA URNS AUTO: ABNORMAL /HPF
SP GR UR STRIP.AUTO: 1.02 (ref 1–1.03)
UROBILINOGEN UR QL STRIP.AUTO: 0.2 E.U./DL
WBC #/AREA URNS AUTO: ABNORMAL /HPF

## 2021-07-19 ENCOUNTER — APPOINTMENT (OUTPATIENT)
Dept: LAB | Facility: CLINIC | Age: 56
End: 2021-07-19
Payer: COMMERCIAL

## 2021-07-19 ENCOUNTER — TELEPHONE (OUTPATIENT)
Dept: INTERNAL MEDICINE CLINIC | Facility: CLINIC | Age: 56
End: 2021-07-19

## 2021-07-19 DIAGNOSIS — E78.2 MODERATE MIXED HYPERLIPIDEMIA NOT REQUIRING STATIN THERAPY: ICD-10-CM

## 2021-07-19 DIAGNOSIS — Z11.4 SCREENING FOR HIV (HUMAN IMMUNODEFICIENCY VIRUS): ICD-10-CM

## 2021-07-19 DIAGNOSIS — Z20.9 HIGH-RISK EXPOSURE TO INFECTIOUS PATIENT: ICD-10-CM

## 2021-07-19 DIAGNOSIS — E07.9 THYROID DISORDER: ICD-10-CM

## 2021-07-19 DIAGNOSIS — Z91.89 HIGH-RISK EXPOSURE TO INFECTIOUS PATIENT: ICD-10-CM

## 2021-07-19 DIAGNOSIS — R73.01 IMPAIRED FASTING GLUCOSE: ICD-10-CM

## 2021-07-19 LAB
ALBUMIN SERPL BCP-MCNC: 3.8 G/DL (ref 3.5–5)
ALP SERPL-CCNC: 76 U/L (ref 46–116)
ALT SERPL W P-5'-P-CCNC: 18 U/L (ref 12–78)
ANION GAP SERPL CALCULATED.3IONS-SCNC: 4 MMOL/L (ref 4–13)
AST SERPL W P-5'-P-CCNC: 16 U/L (ref 5–45)
BASOPHILS # BLD AUTO: 0.03 THOUSANDS/ΜL (ref 0–0.1)
BASOPHILS NFR BLD AUTO: 1 % (ref 0–1)
BILIRUB SERPL-MCNC: 0.34 MG/DL (ref 0.2–1)
BUN SERPL-MCNC: 15 MG/DL (ref 5–25)
CALCIUM SERPL-MCNC: 9.7 MG/DL (ref 8.3–10.1)
CHLORIDE SERPL-SCNC: 107 MMOL/L (ref 100–108)
CHOLEST SERPL-MCNC: 192 MG/DL (ref 50–200)
CO2 SERPL-SCNC: 26 MMOL/L (ref 21–32)
CREAT SERPL-MCNC: 0.82 MG/DL (ref 0.6–1.3)
EOSINOPHIL # BLD AUTO: 0.19 THOUSAND/ΜL (ref 0–0.61)
EOSINOPHIL NFR BLD AUTO: 3 % (ref 0–6)
ERYTHROCYTE [DISTWIDTH] IN BLOOD BY AUTOMATED COUNT: 14.8 % (ref 11.6–15.1)
EST. AVERAGE GLUCOSE BLD GHB EST-MCNC: 123 MG/DL
GFR SERPL CREATININE-BSD FRML MDRD: 82 ML/MIN/1.73SQ M
GLUCOSE SERPL-MCNC: 89 MG/DL (ref 65–140)
HBA1C MFR BLD: 5.9 %
HBV CORE AB SER QL: NORMAL
HBV CORE IGM SER QL: NORMAL
HBV SURFACE AG SER QL: NORMAL
HCT VFR BLD AUTO: 41.9 % (ref 34.8–46.1)
HCV AB SER QL: NORMAL
HDLC SERPL-MCNC: 57 MG/DL
HGB BLD-MCNC: 14.2 G/DL (ref 11.5–15.4)
IMM GRANULOCYTES # BLD AUTO: 0.01 THOUSAND/UL (ref 0–0.2)
IMM GRANULOCYTES NFR BLD AUTO: 0 % (ref 0–2)
LDLC SERPL CALC-MCNC: 121 MG/DL (ref 0–100)
LYMPHOCYTES # BLD AUTO: 2.44 THOUSANDS/ΜL (ref 0.6–4.47)
LYMPHOCYTES NFR BLD AUTO: 41 % (ref 14–44)
MCH RBC QN AUTO: 28.7 PG (ref 26.8–34.3)
MCHC RBC AUTO-ENTMCNC: 33.9 G/DL (ref 31.4–37.4)
MCV RBC AUTO: 85 FL (ref 82–98)
MONOCYTES # BLD AUTO: 0.46 THOUSAND/ΜL (ref 0.17–1.22)
MONOCYTES NFR BLD AUTO: 8 % (ref 4–12)
NEUTROPHILS # BLD AUTO: 2.76 THOUSANDS/ΜL (ref 1.85–7.62)
NEUTS SEG NFR BLD AUTO: 47 % (ref 43–75)
NONHDLC SERPL-MCNC: 135 MG/DL
NRBC BLD AUTO-RTO: 0 /100 WBCS
PLATELET # BLD AUTO: 333 THOUSANDS/UL (ref 149–390)
PMV BLD AUTO: 10.8 FL (ref 8.9–12.7)
POTASSIUM SERPL-SCNC: 3.6 MMOL/L (ref 3.5–5.3)
PROT SERPL-MCNC: 7.6 G/DL (ref 6.4–8.2)
RBC # BLD AUTO: 4.95 MILLION/UL (ref 3.81–5.12)
SODIUM SERPL-SCNC: 137 MMOL/L (ref 136–145)
TRIGL SERPL-MCNC: 72 MG/DL
TSH SERPL DL<=0.05 MIU/L-ACNC: 1.06 UIU/ML (ref 0.36–3.74)
WBC # BLD AUTO: 5.89 THOUSAND/UL (ref 4.31–10.16)

## 2021-07-19 PROCEDURE — 87340 HEPATITIS B SURFACE AG IA: CPT

## 2021-07-19 PROCEDURE — 36415 COLL VENOUS BLD VENIPUNCTURE: CPT

## 2021-07-19 PROCEDURE — 87389 HIV-1 AG W/HIV-1&-2 AB AG IA: CPT

## 2021-07-19 PROCEDURE — 86803 HEPATITIS C AB TEST: CPT

## 2021-07-19 PROCEDURE — 83036 HEMOGLOBIN GLYCOSYLATED A1C: CPT

## 2021-07-19 PROCEDURE — 86705 HEP B CORE ANTIBODY IGM: CPT

## 2021-07-19 PROCEDURE — 86704 HEP B CORE ANTIBODY TOTAL: CPT

## 2021-07-19 PROCEDURE — 85025 COMPLETE CBC W/AUTO DIFF WBC: CPT

## 2021-07-19 PROCEDURE — 80061 LIPID PANEL: CPT

## 2021-07-19 PROCEDURE — 80053 COMPREHEN METABOLIC PANEL: CPT

## 2021-07-19 PROCEDURE — 84443 ASSAY THYROID STIM HORMONE: CPT

## 2021-07-19 NOTE — TELEPHONE ENCOUNTER
I ordered labs and a urine  We do HIV screening on all adults   I did add it but if she isn't comfortable with it I will cancel that order Placenta

## 2021-07-21 LAB — HIV 1+2 AB+HIV1 P24 AG SERPL QL IA: NORMAL

## 2021-08-02 ENCOUNTER — HOSPITAL ENCOUNTER (EMERGENCY)
Facility: HOSPITAL | Age: 56
Discharge: HOME | End: 2021-08-02
Attending: EMERGENCY MEDICINE | Admitting: EMERGENCY MEDICINE
Payer: COMMERCIAL

## 2021-08-02 VITALS
HEART RATE: 82 BPM | DIASTOLIC BLOOD PRESSURE: 73 MMHG | RESPIRATION RATE: 16 BRPM | SYSTOLIC BLOOD PRESSURE: 115 MMHG | TEMPERATURE: 98 F | OXYGEN SATURATION: 98 %

## 2021-08-02 DIAGNOSIS — J02.9 SORE THROAT: Primary | ICD-10-CM

## 2021-08-02 PROCEDURE — 99284 EMERGENCY DEPT VISIT MOD MDM: CPT | Performed by: PHYSICIAN ASSISTANT

## 2021-08-02 PROCEDURE — 99282 EMERGENCY DEPT VISIT SF MDM: CPT

## 2021-08-02 RX ADMIN — DEXAMETHASONE SODIUM PHOSPHATE 10 MG: 10 INJECTION, SOLUTION INTRAMUSCULAR; INTRAVENOUS at 07:28

## 2021-08-02 NOTE — ED PROVIDER NOTES
History  Chief Complaint   Patient presents with    Sore Throat     pt presents with sore throat that started today  with dry cough; pt states house mate is sick with a cold     Patient is a 29-year-old female with history of bipolar disorder presents emergency department complaints of sore throat that began 4 hours ago  She denies any fever, chills, chest pain, shortness of breath  She states that she also has a dry cough  Patient is smoker  Patient states she has had COVID in the past is also vaccinated  Prior to Admission Medications   Prescriptions Last Dose Informant Patient Reported? Taking?    albuterol (2 5 mg/3 mL) 0 083 % nebulizer solution   No No   Sig: Take 1 vial (2 5 mg total) by nebulization every 6 (six) hours as needed for wheezing or shortness of breath   Patient not taking: Reported on 5/3/2021   carBAMazepine (TEGretol) 200 mg tablet  Self No No   Sig: Take 1 tablet (200 mg total) by mouth 2 (two) times a day   Patient not taking: Reported on 5/3/2021   hydrOXYzine pamoate (VISTARIL) 50 mg capsule   Yes No   Sig: Take 50 mg by mouth 3 (three) times a day   ibuprofen (MOTRIN) 800 mg tablet  Self Yes No   Sig: Take by mouth   naproxen (NAPROSYN) 500 mg tablet  Self No No   Sig: Take 1 tablet (500 mg total) by mouth every 12 (twelve) hours as needed for mild pain or moderate pain   Patient not taking: Reported on 5/3/2021   risperiDONE (RisperDAL) 1 mg tablet  Self No No   Sig: Take 1 tablet (1 mg total) by mouth daily at bedtime   Patient not taking: Reported on 5/3/2021      Facility-Administered Medications: None       Past Medical History:   Diagnosis Date    Bipolar disorder ([facility] [address] )     LAST ASSESSED [id number]       Past Surgical History:   Procedure Laterality Date    CYSTOSCOPY      DIAGNOSTIC   8/28/2014 DR Tish Monaco 12/17/2015    DILATION AND CURETTAGE OF UTERUS      X2       Family History   Problem Relation Age of Onset    COPD Mother     Seizures Mother     Hip fracture Mother     Cancer Father      I have reviewed and agree with the history as documented  E-Cigarette/Vaping    E-Cigarette Use Never User      E-Cigarette/Vaping Substances    Nicotine No     THC No     CBD No     Flavoring No     Other No     Unknown No      Social History     Tobacco Use    Smoking status: Current Every Day Smoker     Packs/day: 0 50     Types: Cigarettes    Smokeless tobacco: Never Used   Vaping Use    Vaping Use: Never used   Substance Use Topics    Alcohol use: No    Drug use: Yes     Types: Marijuana     Comment: prescribed       Review of Systems   Constitutional: Negative for fever  HENT: Positive for sore throat  Respiratory: Negative for shortness of breath  Gastrointestinal: Negative for abdominal pain  All other systems reviewed and are negative  Physical Exam  Physical Exam  Vitals reviewed  Constitutional:       Appearance: She is well-developed  HENT:      Head: Normocephalic and atraumatic  Right Ear: Tympanic membrane and ear canal normal       Left Ear: Tympanic membrane and ear canal normal       Mouth/Throat:      Mouth: Mucous membranes are moist       Pharynx: Posterior oropharyngeal erythema present  Eyes:      Conjunctiva/sclera: Conjunctivae normal       Pupils: Pupils are equal, round, and reactive to light  Cardiovascular:      Rate and Rhythm: Normal rate and regular rhythm  Heart sounds: Normal heart sounds  Abdominal:      General: Bowel sounds are normal       Palpations: Abdomen is soft  Musculoskeletal:      Cervical back: Normal range of motion  Skin:     General: Skin is warm  Capillary Refill: Capillary refill takes less than 2 seconds  Neurological:      General: No focal deficit present  Mental Status: She is alert and oriented to person, place, and time     Psychiatric:         Mood and Affect: Mood normal          Behavior: Behavior normal          Vital Signs  ED Triage Vitals [08/02/21 0645] Temperature Pulse Respirations Blood Pressure SpO2   98 °F (36 7 °C) 82 16 115/73 98 %      Temp Source Heart Rate Source Patient Position - Orthostatic VS BP Location FiO2 (%)   Oral Monitor Sitting Left arm --      Pain Score       No Pain           Vitals:    08/02/21 0645   BP: 115/73   Pulse: 82   Patient Position - Orthostatic VS: Sitting         Visual Acuity      ED Medications  Medications   dexamethasone oral liquid 10 mg 1 mL (has no administration in time range)       Diagnostic Studies  Results Reviewed     None                 No orders to display              Procedures  Procedures         ED Course                             SBIRT 20yo+      Most Recent Value   SBIRT (22 yo +)   In order to provide better care to our patients, we are screening all of our patients for alcohol and drug use  Would it be okay to ask you these screening questions? Yes Filed at: 08/02/2021 5020   Initial Alcohol Screen: US AUDIT-C    1  How often do you have a drink containing alcohol? 1 Filed at: 08/02/2021 0726   2  How many drinks containing alcohol do you have on a typical day you are drinking? 0 Filed at: 08/02/2021 0726   3a  Male UNDER 65: How often do you have five or more drinks on one occasion? 0 Filed at: 08/02/2021 0726   3b  FEMALE Any Age, or MALE 65+: How often do you have 4 or more drinks on one occassion? 0 Filed at: 08/02/2021 0726   Audit-C Score  1 Filed at: 08/02/2021 6613   BRUNILDA: How many times in the past year have you    Used an illegal drug or used a prescription medication for non-medical reasons? Never Filed at: 08/02/2021 6653                    MDM  Number of Diagnoses or Management Options  Sore throat  Diagnosis management comments: Patient is a 51-year-old female with history of bipolar disorder presents emergency department complaints of sore throat that began 4 hours ago  She denies any fever, chills, chest pain, shortness of breath  She states that she also has a dry cough  Patient is smoker  Patient states she has had COVID in the past is also vaccinated  On examination, there is erythema noted of the posterior pharynx  There is no tonsillar erythema or swelling  There is no cervical lymphadenopathy noted  Remainder exam is unremarkable  Findings were discussed with patient  I did offer Decadron dose to help with inflammation of her throat  She was agreeable  Recommend continue supportive care  Discussed the etiology of her symptoms are likely related to a virus  Return parameters were discussed  Patient stable for discharge  Risk of Complications, Morbidity, and/or Mortality  Presenting problems: low  Diagnostic procedures: low  Management options: low    Patient Progress  Patient progress: stable      Disposition  Final diagnoses:   Sore throat     Time reflects when diagnosis was documented in both MDM as applicable and the Disposition within this note     Time User Action Codes Description Comment    8/2/2021  7:23 AM Thu Johnson Add [J02 9] Sore throat       ED Disposition     ED Disposition Condition Date/Time Comment    Discharge Good Mon Aug 2, 2021 0723 Trung Corey U  64  discharge to home/self care  Follow-up Information     Follow up With Specialties Details Why Contact Info    Caryl Foreman MD Internal Medicine, Palliative Care Schedule an appointment as soon as possible for a visit   [address] [place] [address]  [phone]            Patient's Medications   Discharge Prescriptions    No medications on file     No discharge procedures on file      PDMP Review     None          ED Provider  Electronically Signed by           Yoana Raymond PA-C  08/02/21 0598

## 2021-08-06 ENCOUNTER — OFFICE VISIT (OUTPATIENT)
Dept: INTERNAL MEDICINE CLINIC | Facility: CLINIC | Age: 56
End: 2021-08-06
Payer: COMMERCIAL

## 2021-08-06 VITALS
WEIGHT: 141.6 LBS | OXYGEN SATURATION: 97 % | SYSTOLIC BLOOD PRESSURE: 110 MMHG | HEIGHT: 67 IN | RESPIRATION RATE: 14 BRPM | HEART RATE: 86 BPM | BODY MASS INDEX: 22.22 KG/M2 | TEMPERATURE: 99 F | DIASTOLIC BLOOD PRESSURE: 60 MMHG

## 2021-08-06 DIAGNOSIS — E78.2 MIXED HYPERLIPIDEMIA: ICD-10-CM

## 2021-08-06 DIAGNOSIS — R73.01 IMPAIRED FASTING GLUCOSE: ICD-10-CM

## 2021-08-06 DIAGNOSIS — F25.0 SCHIZOAFFECTIVE DISORDER, BIPOLAR TYPE (HCC): Primary | ICD-10-CM

## 2021-08-06 DIAGNOSIS — J44.1 COPD WITH EXACERBATION (HCC): ICD-10-CM

## 2021-08-06 PROCEDURE — 3008F BODY MASS INDEX DOCD: CPT | Performed by: INTERNAL MEDICINE

## 2021-08-06 PROCEDURE — 4004F PT TOBACCO SCREEN RCVD TLK: CPT | Performed by: INTERNAL MEDICINE

## 2021-08-06 PROCEDURE — 99214 OFFICE O/P EST MOD 30 MIN: CPT | Performed by: INTERNAL MEDICINE

## 2021-08-06 RX ORDER — ALPRAZOLAM 0.5 MG/1
0.5 TABLET ORAL
COMMUNITY

## 2021-08-06 RX ORDER — FLUOXETINE HYDROCHLORIDE 20 MG/1
20 CAPSULE ORAL DAILY
COMMUNITY

## 2021-08-06 RX ORDER — OLANZAPINE 20 MG/1
20 TABLET ORAL
COMMUNITY

## 2021-08-06 NOTE — PATIENT INSTRUCTIONS
Lab data reviewed in detail and compared prior    Schizoaffective disorder with bipolar features not controlled on current medical regimen  Patient is in extremis and needs psychiatric care immediately  I have referred her to [facility] to consider psychiatric admission      COPD-tobacco cessation advised    Impaired fasting glucose-A1c improved with weight loss    Hyperlipidemia-improved with weight loss, 10 year atherosclerotic risk 3 2%, no indication for treatment
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
## 2022-07-14 ENCOUNTER — VBI (OUTPATIENT)
Dept: ADMINISTRATIVE | Facility: OTHER | Age: 57
End: 2022-07-14

## 2023-02-27 ENCOUNTER — VBI (OUTPATIENT)
Dept: ADMINISTRATIVE | Facility: OTHER | Age: 58
End: 2023-02-27

## 2023-03-16 ENCOUNTER — OFFICE VISIT (OUTPATIENT)
Dept: INTERNAL MEDICINE CLINIC | Facility: CLINIC | Age: 58
End: 2023-03-16

## 2023-03-16 VITALS
DIASTOLIC BLOOD PRESSURE: 66 MMHG | OXYGEN SATURATION: 96 % | HEART RATE: 90 BPM | WEIGHT: 134.6 LBS | RESPIRATION RATE: 17 BRPM | HEIGHT: 65 IN | SYSTOLIC BLOOD PRESSURE: 110 MMHG | TEMPERATURE: 97.6 F | BODY MASS INDEX: 22.42 KG/M2

## 2023-03-16 DIAGNOSIS — H66.93 BILATERAL OTITIS MEDIA, UNSPECIFIED OTITIS MEDIA TYPE: ICD-10-CM

## 2023-03-16 DIAGNOSIS — F41.9 ANXIETY: ICD-10-CM

## 2023-03-16 DIAGNOSIS — F25.0 SCHIZOAFFECTIVE DISORDER, BIPOLAR TYPE (HCC): ICD-10-CM

## 2023-03-16 DIAGNOSIS — Z11.52 ENCOUNTER FOR SCREENING FOR COVID-19: Primary | ICD-10-CM

## 2023-03-16 DIAGNOSIS — J40 BRONCHITIS: ICD-10-CM

## 2023-03-16 RX ORDER — OLANZAPINE 20 MG/1
20 TABLET ORAL
Qty: 30 TABLET | Refills: 0 | Status: SHIPPED | OUTPATIENT
Start: 2023-03-16

## 2023-03-16 RX ORDER — AMOXICILLIN 500 MG/1
500 CAPSULE ORAL EVERY 8 HOURS SCHEDULED
Qty: 21 CAPSULE | Refills: 0 | Status: SHIPPED | OUTPATIENT
Start: 2023-03-16 | End: 2023-03-23

## 2023-03-16 RX ORDER — PREDNISONE 10 MG/1
TABLET ORAL
Qty: 30 TABLET | Refills: 0 | Status: SHIPPED | OUTPATIENT
Start: 2023-03-16 | End: 2023-03-26

## 2023-03-16 RX ORDER — HYDROXYZINE PAMOATE 50 MG/1
50 CAPSULE ORAL 3 TIMES DAILY
Qty: 30 CAPSULE | Refills: 0 | Status: SHIPPED | OUTPATIENT
Start: 2023-03-16

## 2023-03-16 RX ORDER — BENZONATATE 100 MG/1
100 CAPSULE ORAL 3 TIMES DAILY PRN
Qty: 20 CAPSULE | Refills: 0 | Status: SHIPPED | OUTPATIENT
Start: 2023-03-16

## 2023-03-16 NOTE — PROGRESS NOTES
Name: Bart Hammond      : 1965      MRN: [id number]  Encounter Provider: ADRIENNE Nevarez  Encounter Date: 3/16/2023   Enc[facility]   [address]     1  Encounter for screening for COVID-19  -     Covid/Flu- Office Collect    2  Bilateral otitis media, unspecified otitis media type  -     amoxicillin (AMOXIL) 500 mg capsule; Take 1 capsule (500 mg total) by mouth every 8 (eight) hours for 7 days    3  Bronchitis  Comments:  Scattered wheezing, history of  COPD  Orders:  -     predniSONE 10 mg tablet; 4 tab x 4 days, 3 tabs x 3 days, 2 tabs x 2 days, 1 tab x 1 day then stop  -     benzonatate (TESSALON PERLES) 100 mg capsule; Take 1 capsule (100 mg total) by mouth 3 (three) times a day as needed for cough    4  Anxiety  -     hydrOXYzine pamoate (VISTARIL) 50 mg capsule; Take 1 capsule (50 mg total) by mouth 3 (three) times a day    5  Schizoaffective disorder, bipolar type (HCC)  -     OLANZapine (ZyPREXA) 20 MG tablet; Take 1 tablet (20 mg total) by mouth daily at bedtime     Follow-up 2 weeks or earlier if needed    Subjective      Katie Gibson is here for evaluation of headache, congestion and cough for 3 to 4 days  She has a history of anxiety, depression and schizoaffective disease  Reports she discontinued her medications several months ago  Her anxiety is exacerbated by anticipated incarceration within the next 2 weeks  She is requesting documentation from her PCP to receive her mental health medication while being in prison  She is overdue for annual physical and screenings  Patient agreed to make an appointment when she is released after 3 to 4 months  Earache   There is pain in both ears  This is a new problem  The current episode started in the past 7 days  The problem occurs constantly  The problem has been gradually worsening  There has been no fever  The pain is at a severity of 7/10  The pain is moderate   Associated symptoms include coughing, headaches and rhinorrhea  Pertinent negatives include no abdominal pain, ear discharge, hearing loss, sore throat or vomiting  She has tried acetaminophen for the symptoms  The treatment provided no relief  Review of Systems   Constitutional: Negative for chills and fever  HENT: Positive for congestion, ear pain and rhinorrhea  Negative for ear discharge, hearing loss and sore throat  Eyes: Negative  Respiratory: Positive for cough  Negative for chest tightness, shortness of breath and wheezing  Cardiovascular: Negative for chest pain  Gastrointestinal: Negative for abdominal pain and vomiting  Genitourinary: Negative  Neurological: Positive for headaches  Psychiatric/Behavioral: The patient is nervous/anxious          Current Outpatient Medications on File Prior to Visit   Medication Sig   • albuterol (2 5 mg/3 mL) 0 083 % nebulizer solution Take 1 vial (2 5 mg total) by nebulization every 6 (six) hours as needed for wheezing or shortness of breath   • FLUoxetine (PROzac) 20 mg capsule Take 20 mg by mouth daily   • ALPRAZolam (XANAX) 0 5 mg tablet Take 0 5 mg by mouth daily at bedtime as needed for anxiety (Patient not taking: Reported on 3/16/2023)   • carBAMazepine (TEGretol) 200 mg tablet Take 1 tablet (200 mg total) by mouth 2 (two) times a day (Patient not taking: Reported on 8/6/2021)   • ibuprofen (MOTRIN) 800 mg tablet Take by mouth (Patient not taking: Reported on 8/6/2021)   • naproxen (NAPROSYN) 500 mg tablet Take 1 tablet (500 mg total) by mouth every 12 (twelve) hours as needed for mild pain or moderate pain (Patient not taking: Reported on 8/6/2021)   • risperiDONE (RisperDAL) 1 mg tablet Take 1 tablet (1 mg total) by mouth daily at bedtime (Patient not taking: Reported on 8/6/2021)   • [DISCONTINUED] hydrOXYzine pamoate (VISTARIL) 50 mg capsule Take 50 mg by mouth 3 (three) times a day (Patient not taking: Reported on 3/16/2023)   • [DISCONTINUED] OLANZapine (ZyPREXA) 20 MG tablet Take 20 mg by mouth daily at bedtime (Patient not taking: Reported on 3/16/2023)       Objective     /66 (BP Location: Left arm, Patient Position: Sitting, Cuff Size: Standard)   Pulse 90   Temp 97 6 °F (36 4 °C) (Temporal)   Resp 17   Ht 5' 5" (1 651 m)   Wt 61 1 kg (134 lb 9 6 oz)   SpO2 96%   BMI 22 40 kg/m²     Physical Exam  Constitutional:       Appearance: Normal appearance  HENT:      Head: Normocephalic and atraumatic  Right Ear: No decreased hearing noted  Tenderness present  No drainage  Tympanic membrane is erythematous  Left Ear: No decreased hearing noted  Tenderness present  No drainage  Tympanic membrane is erythematous  Nose: Congestion and rhinorrhea present  Mouth/Throat:      Pharynx: No posterior oropharyngeal erythema  Eyes:      Conjunctiva/sclera: Conjunctivae normal    Cardiovascular:      Rate and Rhythm: Normal rate and regular rhythm  Pulses: Normal pulses  Heart sounds: Normal heart sounds  Pulmonary:      Effort: Pulmonary effort is normal       Breath sounds: Wheezing present  Lymphadenopathy:      Cervical: No cervical adenopathy  Skin:     General: Skin is warm and dry  Neurological:      General: No focal deficit present  Mental Status: She is alert and oriented to person, place, and time         [facility], [address] [place] [address]

## 2023-03-17 ENCOUNTER — TELEPHONE (OUTPATIENT)
Dept: INTERNAL MEDICINE CLINIC | Facility: CLINIC | Age: 58
End: 2023-03-17

## 2023-03-17 LAB
FLUAV RNA RESP QL NAA+PROBE: NEGATIVE
FLUBV RNA RESP QL NAA+PROBE: NEGATIVE
SARS-COV-2 RNA RESP QL NAA+PROBE: POSITIVE

## 2023-03-17 NOTE — TELEPHONE ENCOUNTER
----- [facility] sent at 3/17/2023 11:49 AM EDT -----  Please call the patient regarding positive COVID   5 days quarantine additional 5 days with a mask

## 2023-03-17 NOTE — TELEPHONE ENCOUNTER
----- [facility] at 3/17/2023 11:49 AM EDT -----  Please call the patient regarding positive COVID   5 days quarantine additional 5 days with a mask

## 2023-03-20 ENCOUNTER — TELEPHONE (OUTPATIENT)
Dept: INTERNAL MEDICINE CLINIC | Facility: CLINIC | Age: 58
End: 2023-03-20

## 2023-03-20 NOTE — LETTER
March 20, 2023    Patient: Arthur Miranda  YOB: 1965  Date of Last Encounter: 3/20/2023      To whom it may concern:     Arthur Miranda has tested positive for COVID-19 (Coronavirus)  She may return to {RETURN TO WORK/SCHOOL:[id number]} on ***, which is 10 days from illness onset (provided symptoms are improving) and 24 hours without fever      Sincerely,         Geovanny Nelson MA

## 2023-03-20 NOTE — TELEPHONE ENCOUNTER
----- [facility] sent at 3/17/2023 11:49 AM EDT -----  Please call the patient regarding positive COVID   5 days quarantine additional 5 days with a mask

## 2023-03-20 NOTE — TELEPHONE ENCOUNTER
----- [facility] at 3/17/2023 11:49 AM EDT -----  Please call the patient regarding positive COVID   5 days quarantine additional 5 days with a mask

## 2023-03-22 ENCOUNTER — TELEPHONE (OUTPATIENT)
Dept: INTERNAL MEDICINE CLINIC | Facility: CLINIC | Age: 58
End: 2023-03-22

## 2023-07-13 ENCOUNTER — VBI (OUTPATIENT)
Dept: ADMINISTRATIVE | Facility: OTHER | Age: 58
End: 2023-07-13

## 2023-08-24 ENCOUNTER — HOSPITAL ENCOUNTER (EMERGENCY)
Facility: HOSPITAL | Age: 58
Discharge: HOME | End: 2023-08-24
Attending: EMERGENCY MEDICINE
Payer: COMMERCIAL

## 2023-08-24 VITALS
HEART RATE: 88 BPM | RESPIRATION RATE: 18 BRPM | BODY MASS INDEX: 24.83 KG/M2 | SYSTOLIC BLOOD PRESSURE: 135 MMHG | DIASTOLIC BLOOD PRESSURE: 58 MMHG | OXYGEN SATURATION: 92 % | WEIGHT: 149 LBS | TEMPERATURE: 97.9 F | HEIGHT: 65 IN

## 2023-08-24 DIAGNOSIS — Z76.0 MEDICATION REFILL: Primary | ICD-10-CM

## 2023-08-24 DIAGNOSIS — F25.0 SCHIZOAFFECTIVE DISORDER, BIPOLAR TYPE (HCC): ICD-10-CM

## 2023-08-24 PROCEDURE — 99284 EMERGENCY DEPT VISIT MOD MDM: CPT | Performed by: EMERGENCY MEDICINE

## 2023-08-24 PROCEDURE — 99282 EMERGENCY DEPT VISIT SF MDM: CPT

## 2023-08-24 RX ORDER — ZIPRASIDONE HYDROCHLORIDE 40 MG/1
40 CAPSULE ORAL
Qty: 30 CAPSULE | Refills: 0 | Status: SHIPPED | OUTPATIENT
Start: 2023-08-24 | End: 2023-09-23

## 2023-08-24 RX ORDER — HYDROXYZINE HYDROCHLORIDE 25 MG/1
25 TABLET, FILM COATED ORAL 2 TIMES DAILY
Qty: 60 TABLET | Refills: 0 | Status: SHIPPED | OUTPATIENT
Start: 2023-08-24 | End: 2023-09-23

## 2023-08-24 RX ORDER — CARBAMAZEPINE 200 MG/1
200 CAPSULE, EXTENDED RELEASE ORAL 2 TIMES DAILY
Qty: 60 CAPSULE | Refills: 0 | Status: SHIPPED | OUTPATIENT
Start: 2023-08-24 | End: 2023-09-23

## 2023-08-24 NOTE — ED PROVIDER NOTES
History  Chief Complaint   Patient presents with   • Medication Refill     Patient reports being inpatient at [facility] 1 month ago and states they are almost out of their zyprexa, carbamazepine, and hydroxazine, patient reports being still in intake process with their new psychiatrist and was told to come to an ED.      56 y.o. F presents for med refill. Was at [facility] inpatient psych facility. Has intake appt Aug 29  Unable to get meds refilled. No SI/HI, no AH/VH. Prior to Admission Medications   Prescriptions Last Dose Informant Patient Reported? Taking?    ALPRAZolam (XANAX) 0.5 mg tablet  Self Yes No   Sig: Take 0.5 mg by mouth daily at bedtime as needed for anxiety   Patient not taking: Reported on 3/16/2023   FLUoxetine (PROzac) 20 mg capsule  Self Yes No   Sig: Take 20 mg by mouth daily   OLANZapine (ZyPREXA) 20 MG tablet   No No   Sig: Take 1 tablet (20 mg total) by mouth daily at bedtime   albuterol (2.5 mg/3 mL) 0.083 % nebulizer solution  Self No No   Sig: Take 1 vial (2.5 mg total) by nebulization every 6 (six) hours as needed for wheezing or shortness of breath   benzonatate (TESSALON PERLES) 100 mg capsule   No No   Sig: Take 1 capsule (100 mg total) by mouth 3 (three) times a day as needed for cough   carBAMazepine (TEGretol) 200 mg tablet  Self No No   Sig: Take 1 tablet (200 mg total) by mouth 2 (two) times a day   Patient not taking: Reported on 8/6/2021   hydrOXYzine pamoate (VISTARIL) 50 mg capsule   No No   Sig: Take 1 capsule (50 mg total) by mouth 3 (three) times a day   ibuprofen (MOTRIN) 800 mg tablet  Self Yes No   Sig: Take by mouth   Patient not taking: Reported on 8/6/2021   naproxen (NAPROSYN) 500 mg tablet  Self No No   Sig: Take 1 tablet (500 mg total) by mouth every 12 (twelve) hours as needed for mild pain or moderate pain   Patient not taking: Reported on 8/6/2021   risperiDONE (RisperDAL) 1 mg tablet  Self No No   Sig: Take 1 tablet (1 mg total) by mouth daily at bedtime   Patient not taking: Reported on 8/6/2021      Facility-Administered Medications: None       Past Medical History:   Diagnosis Date   • Bipolar disorder ([address])     LAST ASSESSED [id number]       Past Surgical History:   Procedure Laterality Date   • CYSTOSCOPY      DIAGNOSTIC   8/28/2014 DR Maine Cagle 12/17/2015   • DILATION AND CURETTAGE OF UTERUS      X2       Family History   Problem Relation Age of Onset   • COPD Mother    • Seizures Mother    • Hip fracture Mother    • Cancer Father      I have reviewed and agree with the history as documented. E-Cigarette/Vaping   • E-Cigarette Use Never User      E-Cigarette/Vaping Substances   • Nicotine No    • THC No    • CBD No    • Flavoring No    • Other No    • Unknown No      Social History     Tobacco Use   • Smoking status: Every Day     Packs/day: 0.50     Types: Cigarettes   • Smokeless tobacco: Never   Vaping Use   • Vaping Use: Never used   Substance Use Topics   • Alcohol use: No   • Drug use: Yes     Types: Marijuana     Comment: prescribed       Review of Systems   Psychiatric/Behavioral: Positive for sleep disturbance. Negative for dysphoric mood, self-injury and suicidal ideas. The patient is nervous/anxious. All other systems reviewed and are negative. Physical Exam  Physical Exam  Vitals reviewed. Constitutional:       General: She is not in acute distress. Appearance: She is well-developed. She is not diaphoretic. HENT:      Head: Normocephalic and atraumatic. Eyes:      Conjunctiva/sclera: Conjunctivae normal.   Pulmonary:      Effort: Pulmonary effort is normal. No respiratory distress. Musculoskeletal:         General: Normal range of motion. Cervical back: Normal range of motion. Skin:     General: Skin is warm and dry. Coloration: Skin is not pale. Neurological:      Mental Status: She is alert and oriented to person, place, and time. Cranial Nerves: No cranial nerve deficit.    Psychiatric: Comments: anxious         Vital Signs  ED Triage Vitals [08/24/23 1716]   Temperature Pulse Respirations Blood Pressure SpO2   97.9 °F (36.6 °C) 88 18 135/58 92 %      Temp Source Heart Rate Source Patient Position - Orthostatic VS BP Location FiO2 (%)   Oral Monitor Sitting Left arm --      Pain Score       --           Vitals:    08/24/23 1716   BP: 135/58   Pulse: 88   Patient Position - Orthostatic VS: Sitting         Visual Acuity      ED Medications  Medications - No data to display    Diagnostic Studies  Results Reviewed     None                 No orders to display              Procedures  Procedures         ED Course                               SBIRT 20yo+    Flowsheet Row Most Recent Value   Initial Alcohol Screen: US AUDIT-C     1. How often do you have a drink containing alcohol? 0 Filed at: 08/24/2023 1728   2. How many drinks containing alcohol do you have on a typical day you are drinking? 0 Filed at: 08/24/2023 1728   3b. FEMALE Any Age, or MALE 65+: How often do you have 4 or more drinks on one occassion? 0 Filed at: 08/24/2023 1728   Audit-C Score 0 Filed at: 08/24/2023 1728   BRUNILDA: How many times in the past year have you. .. Used an illegal drug or used a prescription medication for non-medical reasons? Daily or Almost Daily Filed at: 08/24/2023 1728   DAST-10: In the past 12 months. ..    1. Have you used drugs other than those required for medical reasons? 1 Filed at: 08/24/2023 1728   2. Do you use more than one drug at a time? 0 Filed at: 08/24/2023 1728   3. Have you had medical problems as a result of your drug use (e.g., memory loss, hepatitis, convulsions, bleeding, etc.)? 0 Filed at: 08/24/2023 1728   4. Have you had "blackouts" or "flashbacks" as a result of drug use? YesNo 0 Filed at: 08/24/2023 1728   5. Do you ever feel bad or guilty about your drug use? 0 Filed at: 08/24/2023 1728   6. Does your spouse (or parent) ever complain about your involvement with drugs?  0 Filed at: 08/24/2023 1728   7. Have you neglected your family because of your use of drugs? 0 Filed at: 08/24/2023 1728   8. Have you engaged in illegal activities in order to obtain drugs? 0 Filed at: 08/24/2023 1728   9. Have you ever experienced withdrawal symptoms (felt sick) when you stopped taking drugs? 0 Filed at: 08/24/2023 1728   10. Are you always able to stop using drugs when you want to? 0 Filed at: 08/24/2023 1728   DAST-10 Score 1 Filed at: 08/24/2023 1728                    Medical Decision Making  Medication refill for psychiatric meds. No SI, no HI, no AH/VH. Anxiety. Sees outpatient psych next week. Risk  Prescription drug management. Disposition  Final diagnoses:   Medication refill   Schizoaffective disorder, bipolar type ([address])     Time reflects when diagnosis was documented in both MDM as applicable and the Disposition within this note     Time User Action Codes Description Comment    8/24/2023  5:37 PM Ronda Hernandez Add [Z76.0] Medication refill     8/24/2023  5:40 PM Belén Hernandez Add [F25.0] Schizoaffective disorder, bipolar type [facility])       ED Disposition     ED Disposition   Discharge    Condition   Stable    Date/Time   Thu Aug 24, 2023 1737    [facility] discharge to home/self care.                Follow-up Information     Follow up With Specialties Details Why Contact Info Additional Information    Erlinda Dumont MD Internal Medicine, Hospice Services, Palliative Care Schedule an appointment as soon as possible for a visit  For follow up to ensure improvement, and for further testing and treatment as needed [facility]  [address] [place]       [address] [place] [address] Emergency Department Emergency Medicine  If symptoms worsen: thoughts of harming yourself or anyone else [address] [facility] Emergency Department, [facility] [place], [place], [address]    outpatient psych               Patient's Medications   Discharge Prescriptions    CARBAMAZEPINE (CARBATROL) 200 MG 12 HR CAPSULE    Take 1 capsule (200 mg total) by mouth 2 (two) times a day       Start Date: 8/24/2023 End Date: 9/23/2023       Order Dose: 200 mg       Quantity: 60 capsule    Refills: 0    HYDROXYZINE HCL (ATARAX) 25 MG TABLET    Take 1 tablet (25 mg total) by mouth 2 (two) times a day As needed for anxiety       Start Date: 8/24/2023 End Date: 9/23/2023       Order Dose: 25 mg       Quantity: 60 tablet    Refills: 0    ZIPRASIDONE (GEODON) 40 MG CAPSULE    Take 1 capsule (40 mg total) by mouth daily with dinner       Start Date: 8/24/2023 End Date: 9/23/2023       Order Dose: 40 mg       Quantity: 30 capsule    Refills: 0       No discharge procedures on file.     PDMP Review     None          ED Provider  Electronically Signed by           Sharath Mcnulty DO  08/24/23 4992

## 2024-02-07 ENCOUNTER — TELEPHONE (OUTPATIENT)
Age: 59
End: 2024-02-07

## 2024-02-07 DIAGNOSIS — E07.9 THYROID DISORDER: ICD-10-CM

## 2024-02-07 DIAGNOSIS — R73.01 IMPAIRED FASTING GLUCOSE: Primary | ICD-10-CM

## 2024-02-07 DIAGNOSIS — R53.83 OTHER FATIGUE: ICD-10-CM

## 2024-02-07 DIAGNOSIS — E78.2 MIXED HYPERLIPIDEMIA: ICD-10-CM

## 2024-02-07 NOTE — TELEPHONE ENCOUNTER
Patient called and set up an appointment to come in for her annual physical and she is requesting labs so she can complete them before her visit on Friday, please advise

## 2024-02-09 ENCOUNTER — APPOINTMENT (OUTPATIENT)
Dept: LAB | Facility: CLINIC | Age: 59
End: 2024-02-09
Payer: COMMERCIAL

## 2024-02-09 ENCOUNTER — OFFICE VISIT (OUTPATIENT)
Age: 59
End: 2024-02-09
Payer: COMMERCIAL

## 2024-02-09 VITALS
DIASTOLIC BLOOD PRESSURE: 74 MMHG | HEART RATE: 85 BPM | WEIGHT: 149 LBS | RESPIRATION RATE: 18 BRPM | SYSTOLIC BLOOD PRESSURE: 100 MMHG | OXYGEN SATURATION: 100 % | HEIGHT: 65 IN | BODY MASS INDEX: 24.83 KG/M2

## 2024-02-09 DIAGNOSIS — R10.13 EPIGASTRIC PAIN: ICD-10-CM

## 2024-02-09 DIAGNOSIS — E07.9 THYROID DISORDER: ICD-10-CM

## 2024-02-09 DIAGNOSIS — Z12.31 ENCOUNTER FOR SCREENING MAMMOGRAM FOR MALIGNANT NEOPLASM OF BREAST: ICD-10-CM

## 2024-02-09 DIAGNOSIS — Z23 ENCOUNTER FOR IMMUNIZATION: ICD-10-CM

## 2024-02-09 DIAGNOSIS — E78.2 MIXED HYPERLIPIDEMIA: ICD-10-CM

## 2024-02-09 DIAGNOSIS — R53.83 OTHER FATIGUE: ICD-10-CM

## 2024-02-09 DIAGNOSIS — R31.9 HEMATURIA, UNSPECIFIED TYPE: ICD-10-CM

## 2024-02-09 DIAGNOSIS — Z12.11 ENCOUNTER FOR SCREENING FOR MALIGNANT NEOPLASM OF COLON: ICD-10-CM

## 2024-02-09 DIAGNOSIS — F25.0 SCHIZOAFFECTIVE DISORDER, BIPOLAR TYPE (HCC): ICD-10-CM

## 2024-02-09 DIAGNOSIS — T65.294S TOXIC EFFECT OF TOBACCO, UNDETERMINED INTENT, SEQUELA: ICD-10-CM

## 2024-02-09 DIAGNOSIS — J44.1 COPD WITH EXACERBATION (HCC): ICD-10-CM

## 2024-02-09 DIAGNOSIS — Z01.419 ENCOUNTER FOR CERVICAL PAP SMEAR WITH PELVIC EXAM: ICD-10-CM

## 2024-02-09 DIAGNOSIS — M25.552 PAIN OF LEFT HIP: ICD-10-CM

## 2024-02-09 DIAGNOSIS — R73.01 IMPAIRED FASTING GLUCOSE: ICD-10-CM

## 2024-02-09 DIAGNOSIS — F17.210 CIGARETTE NICOTINE DEPENDENCE WITHOUT COMPLICATION: ICD-10-CM

## 2024-02-09 DIAGNOSIS — Z00.00 ANNUAL PHYSICAL EXAM: Primary | ICD-10-CM

## 2024-02-09 PROBLEM — G54.0 THORACIC OUTLET SYNDROME: Status: ACTIVE | Noted: 2024-02-09

## 2024-02-09 LAB
ALBUMIN SERPL BCP-MCNC: 4.4 G/DL (ref 3.5–5)
ALP SERPL-CCNC: 94 U/L (ref 34–104)
ALT SERPL W P-5'-P-CCNC: 10 U/L (ref 7–52)
ANION GAP SERPL CALCULATED.3IONS-SCNC: 8 MMOL/L
AST SERPL W P-5'-P-CCNC: 17 U/L (ref 13–39)
BASOPHILS # BLD AUTO: 0.04 THOUSANDS/ÂΜL (ref 0–0.1)
BASOPHILS NFR BLD AUTO: 1 % (ref 0–1)
BILIRUB SERPL-MCNC: 0.45 MG/DL (ref 0.2–1)
BUN SERPL-MCNC: 15 MG/DL (ref 5–25)
CALCIUM SERPL-MCNC: 10 MG/DL (ref 8.4–10.2)
CHLORIDE SERPL-SCNC: 103 MMOL/L (ref 96–108)
CHOLEST SERPL-MCNC: 211 MG/DL
CO2 SERPL-SCNC: 29 MMOL/L (ref 21–32)
CREAT SERPL-MCNC: 0.74 MG/DL (ref 0.6–1.3)
EOSINOPHIL # BLD AUTO: 0.24 THOUSAND/ÂΜL (ref 0–0.61)
EOSINOPHIL NFR BLD AUTO: 3 % (ref 0–6)
ERYTHROCYTE [DISTWIDTH] IN BLOOD BY AUTOMATED COUNT: 14.2 % (ref 11.6–15.1)
EST. AVERAGE GLUCOSE BLD GHB EST-MCNC: 128 MG/DL
GFR SERPL CREATININE-BSD FRML MDRD: 90 ML/MIN/1.73SQ M
GLUCOSE P FAST SERPL-MCNC: 94 MG/DL (ref 65–99)
HBA1C MFR BLD: 6.1 %
HCT VFR BLD AUTO: 43.5 % (ref 34.8–46.1)
HDLC SERPL-MCNC: 54 MG/DL
HGB BLD-MCNC: 14.2 G/DL (ref 11.5–15.4)
IMM GRANULOCYTES # BLD AUTO: 0.02 THOUSAND/UL (ref 0–0.2)
IMM GRANULOCYTES NFR BLD AUTO: 0 % (ref 0–2)
LDLC SERPL CALC-MCNC: 140 MG/DL (ref 0–100)
LYMPHOCYTES # BLD AUTO: 2.42 THOUSANDS/ÂΜL (ref 0.6–4.47)
LYMPHOCYTES NFR BLD AUTO: 30 % (ref 14–44)
MCH RBC QN AUTO: 29.5 PG (ref 26.8–34.3)
MCHC RBC AUTO-ENTMCNC: 32.6 G/DL (ref 31.4–37.4)
MCV RBC AUTO: 90 FL (ref 82–98)
MONOCYTES # BLD AUTO: 0.45 THOUSAND/ÂΜL (ref 0.17–1.22)
MONOCYTES NFR BLD AUTO: 6 % (ref 4–12)
NEUTROPHILS # BLD AUTO: 5.03 THOUSANDS/ÂΜL (ref 1.85–7.62)
NEUTS SEG NFR BLD AUTO: 60 % (ref 43–75)
NONHDLC SERPL-MCNC: 157 MG/DL
NRBC BLD AUTO-RTO: 0 /100 WBCS
PLATELET # BLD AUTO: 334 THOUSANDS/UL (ref 149–390)
PMV BLD AUTO: 10.1 FL (ref 8.9–12.7)
POTASSIUM SERPL-SCNC: 4.1 MMOL/L (ref 3.5–5.3)
PROT SERPL-MCNC: 7.1 G/DL (ref 6.4–8.4)
RBC # BLD AUTO: 4.81 MILLION/UL (ref 3.81–5.12)
SODIUM SERPL-SCNC: 140 MMOL/L (ref 135–147)
TRIGL SERPL-MCNC: 84 MG/DL
TSH SERPL DL<=0.05 MIU/L-ACNC: 1.25 UIU/ML (ref 0.45–4.5)
WBC # BLD AUTO: 8.2 THOUSAND/UL (ref 4.31–10.16)

## 2024-02-09 PROCEDURE — 90686 IIV4 VACC NO PRSV 0.5 ML IM: CPT

## 2024-02-09 PROCEDURE — 36415 COLL VENOUS BLD VENIPUNCTURE: CPT

## 2024-02-09 PROCEDURE — 83036 HEMOGLOBIN GLYCOSYLATED A1C: CPT

## 2024-02-09 PROCEDURE — 80053 COMPREHEN METABOLIC PANEL: CPT

## 2024-02-09 PROCEDURE — 90471 IMMUNIZATION ADMIN: CPT

## 2024-02-09 PROCEDURE — 99396 PREV VISIT EST AGE 40-64: CPT

## 2024-02-09 PROCEDURE — 84443 ASSAY THYROID STIM HORMONE: CPT

## 2024-02-09 PROCEDURE — 80061 LIPID PANEL: CPT

## 2024-02-09 PROCEDURE — 85025 COMPLETE CBC W/AUTO DIFF WBC: CPT

## 2024-02-09 RX ORDER — ALBUTEROL SULFATE 2.5 MG/3ML
2.5 SOLUTION RESPIRATORY (INHALATION) EVERY 6 HOURS PRN
Qty: 75 ML | Refills: 3 | Status: SHIPPED | OUTPATIENT
Start: 2024-02-09

## 2024-02-09 RX ORDER — ARIPIPRAZOLE LAUROXIL 662 MG/2.4ML
INJECTION, SUSPENSION, EXTENDED RELEASE INTRAMUSCULAR
COMMUNITY
Start: 2024-01-16

## 2024-02-09 NOTE — PROGRESS NOTES
INTERNAL MEDICINE FOLLOW-UP VISIT  [facility] - [facility] INTERNAL MEDICINE [address]    NAME: Capri Brandon  AGE: 58 y.o. SEX: female  : 1965     DATE: 2024     Assessment and Plan:   1. Annual physical exam  BMI is good at 24.79.  Discussed smoking cessation with patient which she is not currently interested at this time.  Eat a wide variety of fresh fruits and vegetables and lean meats.  Avoid processed and sugary snacks.  Stay physically active with 30 minutes/day of daily exercise  Flu vaccine today    2. COPD with exacerbation (HCC)  Intermittent coughing. Intermittent use of inhaler using about 1-2 weeks.   Quit smoking. Cutterelty 0.75 ppd for about 40 some years    3. Thyroid disorder  Labs due, she is unclear of this diagnosis will reevaluate after labs     4. Schizoaffective disorder, bipolar type (HCC)  Follows with [facility] group for therapy and medication management.  Stable with medications she is taking     5. Mixed hyperlipidemia  Lipids due    6. Encounter for screening mammogram for malignant neoplasm of breast  Referred to mammogram    7. Encounter for cervical Pap smear with pelvic exam  Refer to OB/GYN for cervical cancer screening    8. Encounter for screening for malignant neoplasm of colon  Will order GI referral for colonoscopy    9.  Right upper quad/epigastric pain  Ultrasound abdomen to evaluate liver currently a smoker as well as alcohol use.  Abdomen is slightly distended no pain on palpation.  No nausea or vomiting        No follow-ups on file.       Chief Complaint:     Chief Complaint   Patient presents with   • Establish Care      History of Present Illness:     Here to re-establish has not been here in 2 years. She is here now to get care and labs and preventative medicine   She does complain of right upper epigastric and upper quad pain only when bending over.  No pain on palpation.    The following portions of the patient's history were reviewed  and updated as appropriate: allergies, current medications, past family history, past medical history, past social history, past surgical history and problem list.     Review of Systems:     Review of Systems   Constitutional:  Negative for appetite change, chills, diaphoresis, fatigue, fever and unexpected weight change.   HENT:  Negative for postnasal drip and sneezing.    Eyes:  Negative for visual disturbance.   Respiratory:  Negative for chest tightness and shortness of breath.    Cardiovascular:  Negative for chest pain, palpitations and leg swelling.   Gastrointestinal:  Negative for abdominal pain and blood in stool.   Endocrine: Negative for cold intolerance, heat intolerance, polydipsia, polyphagia and polyuria.   Genitourinary:  Negative for difficulty urinating, dysuria, frequency and urgency.   Musculoskeletal:  Negative for arthralgias and myalgias.   Skin:  Negative for rash and wound.   Neurological:  Negative for dizziness, weakness, light-headedness and headaches.   Hematological:  Negative for adenopathy.   Psychiatric/Behavioral:  Negative for confusion, dysphoric mood and sleep disturbance. The patient is not nervous/anxious.         Past Medical History:     Past Medical History:   Diagnosis Date   • Bipolar disorder ([facility])     LAST ASSESSED 28AUG2014        Current Medications:     Current Outpatient Medications:   •  albuterol (2.5 mg/3 mL) 0.083 % nebulizer solution, Take 3 mL (2.5 mg total) by nebulization every 6 (six) hours as needed for wheezing or shortness of breath, Disp: 75 mL, Rfl: 3  •  ALPRAZolam (XANAX) 0.5 mg tablet, Take 0.5 mg by mouth daily at bedtime as needed for anxiety, Disp: , Rfl:   •  [place] 662 MG/2.4ML PRSY, , Disp: , Rfl:   •  benzonatate (TESSALON PERLES) 100 mg capsule, Take 1 capsule (100 mg total) by mouth 3 (three) times a day as needed for cough, Disp: 20 capsule, Rfl: 0     Allergies:   No Known Allergies     Physical Exam:     /74 (BP Location: Left  "arm)   Pulse 85   Resp 18   Ht 5' 5\" (1.651 m)   Wt 67.6 kg (149 lb)   SpO2 100%   BMI 24.79 kg/m²     Physical Exam  Constitutional:       Appearance: She is well-developed.   HENT:      Head: Normocephalic and atraumatic.   Eyes:      Pupils: Pupils are equal, round, and reactive to light.   Neck:      Thyroid: No thyromegaly.   Cardiovascular:      Rate and Rhythm: Normal rate and regular rhythm.      Heart sounds: No murmur heard.  Pulmonary:      Effort: Pulmonary effort is normal.      Breath sounds: Normal breath sounds.   Abdominal:      General: Bowel sounds are normal.      Palpations: Abdomen is soft.   Musculoskeletal:         General: Normal range of motion.      Cervical back: Normal range of motion and neck supple.   Lymphadenopathy:      Cervical: No cervical adenopathy.   Skin:     General: Skin is warm and dry.   Neurological:      Mental Status: She is alert and oriented to person, place, and time.           Data:     Laboratory Results: I have personally reviewed the pertinent laboratory results/reports   Radiology/Other Diagnostic Testing Results: I have personally reviewed pertinent reports.      ADRIENNE Moncada  [facility] INTERNAL MEDICINE [address]  "

## 2024-02-12 ENCOUNTER — TELEPHONE (OUTPATIENT)
Age: 59
End: 2024-02-12

## 2024-02-12 DIAGNOSIS — R53.83 OTHER FATIGUE: ICD-10-CM

## 2024-02-12 DIAGNOSIS — E07.9 THYROID DISORDER: ICD-10-CM

## 2024-02-12 DIAGNOSIS — E78.2 MIXED HYPERLIPIDEMIA: ICD-10-CM

## 2024-02-12 DIAGNOSIS — R73.01 IMPAIRED FASTING GLUCOSE: Primary | ICD-10-CM

## 2024-02-12 NOTE — TELEPHONE ENCOUNTER
----- Message from Radha Moran PA-C sent at 2/12/2024  7:43 AM EST -----  Your A1c, or the measure of your sugars over the past 3 months, is slightly elevated at 6.1.  This puts you in the prediabetic range.  Try to limit sugars and carbs like bread, rice, and pasta.  Your cholesterol is moderately elevated.  Try to limit saturated fats, fried foods, fast foods, red meats in diet.  The rest of your labs look great.  I am going to place lab orders to get done prior to your 6-month follow-up.

## 2024-02-19 ENCOUNTER — HOSPITAL ENCOUNTER (OUTPATIENT)
Dept: CT IMAGING | Facility: HOSPITAL | Age: 59
Discharge: HOME | End: 2024-02-19
Payer: COMMERCIAL

## 2024-02-19 DIAGNOSIS — F17.210 CIGARETTE NICOTINE DEPENDENCE WITHOUT COMPLICATION: ICD-10-CM

## 2024-02-19 PROCEDURE — 71271 CT THORAX LUNG CANCER SCR C-: CPT

## 2024-02-21 PROBLEM — N39.0 UTI (URINARY TRACT INFECTION): Status: RESOLVED | Noted: 2018-12-18 | Resolved: 2024-02-21

## 2024-02-22 ENCOUNTER — TELEPHONE (OUTPATIENT)
Age: 59
End: 2024-02-22

## 2024-02-22 ENCOUNTER — PREP FOR PROCEDURE (OUTPATIENT)
Age: 59
End: 2024-02-22

## 2024-02-22 DIAGNOSIS — Z12.11 SCREENING FOR COLON CANCER: Primary | ICD-10-CM

## 2024-02-22 NOTE — TELEPHONE ENCOUNTER
02/22/24  Screened by: Patricia Almonte    Referring Provider - Lisa Terry    24.79    Has patient been referred for a routine screening Colonoscopy? yes  Is the patient between 45-75 years old? yes      Previous Colonoscopy no    Does the patient want to see a Gastroenterologist prior to their procedure OR are they having any GI symptoms? no    Has the patient been hospitalized or had abdominal surgery in the past 6 months? no    Does the patient use supplemental oxygen? no    Does the patient take Coumadin, Lovenox, Plavix, Elliquis, Xarelto, or other blood thinning medication? no    Has the patient had a stroke, cardiac event, or stent placed in the past year? no

## 2024-02-22 NOTE — TELEPHONE ENCOUNTER
Scheduled date of colonoscopy (as of today): 03/11/2024    Physician performing colonoscopy: Ganesh    Location of colonoscopy: [place]    Bowel prep reviewed with patient: Miralax    Instructions reviewed with patient by: Email  JOEY Owens    Clearances: None

## 2024-02-25 ENCOUNTER — HOSPITAL ENCOUNTER (OUTPATIENT)
Dept: ULTRASOUND IMAGING | Facility: HOSPITAL | Age: 59
Discharge: HOME | End: 2024-02-25
Payer: COMMERCIAL

## 2024-02-25 DIAGNOSIS — R10.13 EPIGASTRIC PAIN: ICD-10-CM

## 2024-02-25 PROCEDURE — 76700 US EXAM ABDOM COMPLETE: CPT

## 2024-03-13 ENCOUNTER — HOSPITAL ENCOUNTER (OUTPATIENT)
Age: 59
Discharge: HOME | End: 2024-03-13
Payer: COMMERCIAL

## 2024-03-13 VITALS — BODY MASS INDEX: 23.39 KG/M2 | WEIGHT: 149 LBS | HEIGHT: 67 IN

## 2024-03-13 DIAGNOSIS — Z12.31 ENCOUNTER FOR SCREENING MAMMOGRAM FOR MALIGNANT NEOPLASM OF BREAST: ICD-10-CM

## 2024-03-13 PROCEDURE — 77063 BREAST TOMOSYNTHESIS BI: CPT

## 2024-03-13 PROCEDURE — 77067 SCR MAMMO BI INCL CAD: CPT

## 2024-03-14 ENCOUNTER — TELEPHONE (OUTPATIENT)
Age: 59
End: 2024-03-14

## 2024-03-14 NOTE — TELEPHONE ENCOUNTER
----- Message from ADRIENNE Moncada sent at 3/14/2024 10:24 AM EDT -----  Normal mammogram repeat in 1 year

## 2024-03-18 ENCOUNTER — ANESTHESIA (OUTPATIENT)
Dept: GASTROENTEROLOGY | Facility: HOSPITAL | Age: 59
End: 2024-03-18

## 2024-03-18 ENCOUNTER — ANESTHESIA EVENT (OUTPATIENT)
Dept: GASTROENTEROLOGY | Facility: HOSPITAL | Age: 59
End: 2024-03-18

## 2024-03-18 ENCOUNTER — HOSPITAL ENCOUNTER (OUTPATIENT)
Dept: GASTROENTEROLOGY | Facility: HOSPITAL | Age: 59
Setting detail: OUTPATIENT SURGERY
Discharge: HOME | End: 2024-03-18
Attending: INTERNAL MEDICINE
Payer: COMMERCIAL

## 2024-03-18 VITALS
HEIGHT: 67 IN | TEMPERATURE: 97.6 F | WEIGHT: 147.93 LBS | RESPIRATION RATE: 20 BRPM | BODY MASS INDEX: 23.22 KG/M2 | DIASTOLIC BLOOD PRESSURE: 67 MMHG | OXYGEN SATURATION: 98 % | SYSTOLIC BLOOD PRESSURE: 158 MMHG | HEART RATE: 60 BPM

## 2024-03-18 DIAGNOSIS — Z12.11 SCREENING FOR COLON CANCER: ICD-10-CM

## 2024-03-18 PROCEDURE — 88305 TISSUE EXAM BY PATHOLOGIST: CPT | Performed by: PATHOLOGY

## 2024-03-18 RX ORDER — EPHEDRINE SULFATE 50 MG/ML
INJECTION INTRAVENOUS AS NEEDED
Status: DISCONTINUED | OUTPATIENT
Start: 2024-03-18 | End: 2024-03-18

## 2024-03-18 RX ORDER — PROPOFOL 10 MG/ML
INJECTION, EMULSION INTRAVENOUS CONTINUOUS PRN
Status: DISCONTINUED | OUTPATIENT
Start: 2024-03-18 | End: 2024-03-18

## 2024-03-18 RX ORDER — PROPOFOL 10 MG/ML
INJECTION, EMULSION INTRAVENOUS AS NEEDED
Status: DISCONTINUED | OUTPATIENT
Start: 2024-03-18 | End: 2024-03-18

## 2024-03-18 RX ORDER — SODIUM CHLORIDE, SODIUM LACTATE, POTASSIUM CHLORIDE, CALCIUM CHLORIDE 600; 310; 30; 20 MG/100ML; MG/100ML; MG/100ML; MG/100ML
INJECTION, SOLUTION INTRAVENOUS CONTINUOUS PRN
Status: DISCONTINUED | OUTPATIENT
Start: 2024-03-18 | End: 2024-03-18

## 2024-03-18 RX ADMIN — PROPOFOL 30 MG: 10 INJECTION, EMULSION INTRAVENOUS at 12:22

## 2024-03-18 RX ADMIN — PROPOFOL 20 MG: 10 INJECTION, EMULSION INTRAVENOUS at 12:20

## 2024-03-18 RX ADMIN — SODIUM CHLORIDE, SODIUM LACTATE, POTASSIUM CHLORIDE, AND CALCIUM CHLORIDE: .6; .31; .03; .02 INJECTION, SOLUTION INTRAVENOUS at 12:05

## 2024-03-18 RX ADMIN — PROPOFOL 30 MG: 10 INJECTION, EMULSION INTRAVENOUS at 12:23

## 2024-03-18 RX ADMIN — PROPOFOL 20 MG: 10 INJECTION, EMULSION INTRAVENOUS at 12:21

## 2024-03-18 RX ADMIN — PROPOFOL 100 MCG/KG/MIN: 10 INJECTION, EMULSION INTRAVENOUS at 12:29

## 2024-03-18 RX ADMIN — PROPOFOL 30 MG: 10 INJECTION, EMULSION INTRAVENOUS at 12:41

## 2024-03-18 RX ADMIN — PROPOFOL 30 MG: 10 INJECTION, EMULSION INTRAVENOUS at 12:29

## 2024-03-18 RX ADMIN — EPHEDRINE SULFATE 10 MG: 50 INJECTION, SOLUTION INTRAVENOUS at 12:32

## 2024-03-18 RX ADMIN — PROPOFOL 50 MG: 10 INJECTION, EMULSION INTRAVENOUS at 12:18

## 2024-03-18 RX ADMIN — PROPOFOL 20 MG: 10 INJECTION, EMULSION INTRAVENOUS at 12:19

## 2024-03-18 RX ADMIN — PROPOFOL 30 MG: 10 INJECTION, EMULSION INTRAVENOUS at 12:25

## 2024-03-18 NOTE — ANESTHESIA POSTPROCEDURE EVALUATION
Post-Op Assessment Note    CV Status:  Stable    Pain management: adequate       Mental Status:  Awake and sleepy   Hydration Status:  Euvolemic   PONV Controlled:  Controlled   Airway Patency:  Patent     Post Op Vitals Reviewed: Yes    No anethesia notable event occurred.    Staff: CRNA               BP   94/56   Temp 97.6   Pulse 64   Resp 15   SpO2 98

## 2024-03-18 NOTE — ANESTHESIA PREPROCEDURE EVALUATION
Procedure:  COLONOSCOPY    Marijuana use  Smoker    Bipolar   Relevant Problems   CARDIO   (+) Hyperlipidemia      /RENAL   (+) Nephrolithiasis        Physical Exam    Airway    Mallampati score: II  TM Distance: >3 FB  Neck ROM: full     Dental    upper dentures    Cardiovascular  Rhythm: regular, Rate: normal    Pulmonary   Breath sounds clear to auscultation    Other Findings  post-pubertal.      Anesthesia Plan  ASA Score- 2     Anesthesia Type- IV sedation with anesthesia with ASA Monitors.         Additional Monitors:     Airway Plan:            Plan Factors-    Chart reviewed.   Existing labs reviewed. Patient summary reviewed.    Patient is a current smoker.              Induction- intravenous.    Postoperative Plan-     Informed Consent- Anesthetic plan and risks discussed with patient.  I personally reviewed this patient with the CRNA. Discussed and agreed on the Anesthesia Plan with the CRNA..

## 2024-03-18 NOTE — H&P
"History and Physical -  [facility]  Capri Brandon 58 y.o. female MRN: [id number]      HPI: Capri Brandon is a 58 y.o. year old female who presents for screening colonoscopy      REVIEW OF SYSTEMS: Per the HPI, and otherwise unremarkable.    Historical Information   Past Medical History:   Diagnosis Date    Bipolar disorder (HCC)     LAST ASSESSED 28AUG2014     Past Surgical History:   Procedure Laterality Date    CYSTOSCOPY      DIAGNOSTIC   8/28/2014 DR POWELL 12/17/2015    DILATION AND CURETTAGE OF UTERUS      X2     Social History   Social History     Substance and Sexual Activity   Alcohol Use No     Social History     Substance and Sexual Activity   Drug Use Yes    Types: Marijuana    Comment: prescribed     Social History     Tobacco Use   Smoking Status Every Day    Current packs/day: 0.25    Types: Cigarettes   Smokeless Tobacco Never     Family History   Problem Relation Age of Onset    COPD Mother     Seizures Mother     Hip fracture Mother     Cancer Father        Meds/Allergies     (Not in a hospital admission)      No Known Allergies    Objective     Blood pressure 103/65, pulse 87, temperature 98.4 °F (36.9 °C), temperature source Oral, resp. rate 16, height 5' 7\" (1.702 m), weight 67.1 kg (147 lb 14.9 oz), SpO2 100%.      PHYSICAL EXAM    Gen: NAD  CV: RRR  CHEST: Clear  ABD: soft, NT/ND  EXT: no edema      ASSESSMENT/PLAN:  This is a 58 y.o. year old female here for colonoscopy, and she is stable and optimized for her procedure.          "

## 2024-03-19 ENCOUNTER — APPOINTMENT (OUTPATIENT)
Dept: LAB | Facility: HOSPITAL | Age: 59
End: 2024-03-19
Payer: COMMERCIAL

## 2024-03-19 ENCOUNTER — HOSPITAL ENCOUNTER (OUTPATIENT)
Dept: RADIOLOGY | Facility: HOSPITAL | Age: 59
Discharge: HOME | End: 2024-03-19
Payer: COMMERCIAL

## 2024-03-19 DIAGNOSIS — M25.552 PAIN OF LEFT HIP: ICD-10-CM

## 2024-03-19 DIAGNOSIS — R31.9 HEMATURIA, UNSPECIFIED TYPE: ICD-10-CM

## 2024-03-19 DIAGNOSIS — F17.210 CIGARETTE NICOTINE DEPENDENCE WITHOUT COMPLICATION: ICD-10-CM

## 2024-03-19 LAB
BACTERIA UR QL AUTO: ABNORMAL /HPF
BILIRUB UR QL STRIP: NEGATIVE
CLARITY UR: CLEAR
COLOR UR: YELLOW
GLUCOSE UR STRIP-MCNC: NEGATIVE MG/DL
HGB UR QL STRIP.AUTO: ABNORMAL
KETONES UR STRIP-MCNC: NEGATIVE MG/DL
LEUKOCYTE ESTERASE UR QL STRIP: ABNORMAL
MUCOUS THREADS UR QL AUTO: ABNORMAL
NITRITE UR QL STRIP: NEGATIVE
NON-SQ EPI CELLS URNS QL MICRO: ABNORMAL /HPF
PH UR STRIP.AUTO: 5.5 [PH]
PROT UR STRIP-MCNC: NEGATIVE MG/DL
RBC #/AREA URNS AUTO: ABNORMAL /HPF
SP GR UR STRIP.AUTO: 1.03 (ref 1–1.03)
UROBILINOGEN UR STRIP-ACNC: <2 MG/DL
WBC #/AREA URNS AUTO: ABNORMAL /HPF

## 2024-03-19 PROCEDURE — 81001 URINALYSIS AUTO W/SCOPE: CPT

## 2024-03-19 PROCEDURE — 88305 TISSUE EXAM BY PATHOLOGIST: CPT | Performed by: PATHOLOGY

## 2024-03-19 PROCEDURE — 73502 X-RAY EXAM HIP UNI 2-3 VIEWS: CPT

## 2024-03-20 ENCOUNTER — TELEPHONE (OUTPATIENT)
Age: 59
End: 2024-03-20

## 2024-03-20 NOTE — TELEPHONE ENCOUNTER
----- Message from ADRIENNE Moncada sent at 3/20/2024  8:27 AM EDT -----  No fracture or dislocation, no significant arthritis to the hip.  You do have some mild degenerative, arthritic changes to your lower back.  This could also be causing the pain that you feel toward your hip area.  I can send you to physical therapy i  f you are interested

## 2024-03-26 ENCOUNTER — TELEPHONE (OUTPATIENT)
Dept: GASTROENTEROLOGY | Facility: CLINIC | Age: 59
End: 2024-03-26

## 2024-03-26 NOTE — TELEPHONE ENCOUNTER
----- Message from Pato Chen DO sent at 3/25/2024  7:54 PM EDT -----  Patient has not read [employer] message. Please call and share results.      Capri,     The polyps that were taken from the colon showed 2 precancerous polyps.  The remaining polyps were all benign polyps and this is good news.  This means that your next colonoscopy will be in 3 years rather than 1 year.

## 2024-03-26 NOTE — TELEPHONE ENCOUNTER
Called pt and relayed the polyps results and to do a colonoscopy in 3 years.    Pt voiced understanding.          Cpari,     The polyps that were taken from the colon showed 2 precancerous polyps.  The remaining polyps were all benign polyps and this is good news.  This means that your next colonoscopy will be in 3 years rather than 1 year.

## 2024-03-28 ENCOUNTER — ANNUAL EXAM (OUTPATIENT)
Age: 59
End: 2024-03-28
Payer: COMMERCIAL

## 2024-03-28 VITALS — BODY MASS INDEX: 23.49 KG/M2 | WEIGHT: 150 LBS

## 2024-03-28 DIAGNOSIS — Z12.31 SCREENING MAMMOGRAM FOR BREAST CANCER: ICD-10-CM

## 2024-03-28 DIAGNOSIS — Z78.0 ASYMPTOMATIC POSTMENOPAUSAL STATUS: ICD-10-CM

## 2024-03-28 DIAGNOSIS — Z01.419 ENCOUNTER FOR GYNECOLOGICAL EXAMINATION WITHOUT ABNORMAL FINDING: Primary | ICD-10-CM

## 2024-03-28 PROBLEM — F17.200 NICOTINE USE DISORDER: Status: ACTIVE | Noted: 2023-07-14

## 2024-03-28 PROBLEM — J02.9 SORE THROAT: Status: RESOLVED | Noted: 2021-08-02 | Resolved: 2024-03-28

## 2024-03-28 PROBLEM — F31.2 BIPOLAR DISORDER, CURRENT EPISODE MANIC SEVERE WITH PSYCHOTIC FEATURES (HCC): Status: ACTIVE | Noted: 2023-07-14

## 2024-03-28 PROBLEM — F15.20 AMPHETAMINE USE DISORDER, SEVERE (HCC): Status: ACTIVE | Noted: 2023-07-14

## 2024-03-28 PROBLEM — F20.9 SCHIZOPHRENIA (HCC): Status: ACTIVE | Noted: 2023-07-13

## 2024-03-28 PROBLEM — F12.90 CANNABIS USE DISORDER: Status: ACTIVE | Noted: 2023-07-14

## 2024-03-28 PROBLEM — F41.9 ANXIETY: Status: ACTIVE | Noted: 2023-07-14

## 2024-03-28 PROCEDURE — G0145 SCR C/V CYTO,THINLAYER,RESCR: HCPCS | Performed by: NURSE PRACTITIONER

## 2024-03-28 PROCEDURE — 99386 PREV VISIT NEW AGE 40-64: CPT | Performed by: NURSE PRACTITIONER

## 2024-03-28 PROCEDURE — G0476 HPV COMBO ASSAY CA SCREEN: HCPCS | Performed by: NURSE PRACTITIONER

## 2024-03-28 NOTE — PROGRESS NOTES
Diagnoses and all orders for this visit:    Encounter for gynecological examination without abnormal finding  -     Liquid-based pap, screening    Asymptomatic postmenopausal status    Screening mammogram for breast cancer  -     Mammo screening bilateral w 3d & cad; Future    Call as needed, encouraged calcium/vit D in her diet, call with any PMB, all questions answered      PPleasant 58 y.o. postmenopausal female here for annual exam. She denies postmenopausal bleeding. She + history of abnormal pap smears at age 19, last Pap was reportedly done in 2014. A pap with cotest was done today. She denies vaginal issues. She denies pelvic pain. She denies postmenopausal issues. She is sexually active, declines an STD screen. She is single after being in a relationship for 30 years. Last colonoscopy done 03/18/2024, due again in 3 years. Last mammogram 03/13/2024 normal. +Smoker.    Past Medical History:   Diagnosis Date    Bipolar disorder (HCC)     LAST ASSESSED [id number]     Past Surgical History:   Procedure Laterality Date    CYSTOSCOPY      DIAGNOSTIC   8/28/2014 DR POWELL 12/17/2015    DILATION AND CURETTAGE OF UTERUS      X2     Family History   Problem Relation Age of Onset    COPD Mother     Seizures Mother     Hip fracture Mother     Cancer Father      Social History     Tobacco Use    Smoking status: Every Day     Current packs/day: 0.25     Types: Cigarettes    Smokeless tobacco: Never   Vaping Use    Vaping status: Never Used   Substance Use Topics    Alcohol use: No    Drug use: Yes     Types: Marijuana     Comment: prescribed       Current Outpatient Medications:     Aristada 662 MG/2.4ML PRSY, , Disp: , Rfl:   Patient Active Problem List    Diagnosis Date Noted    Thoracic outlet syndrome 02/09/2024    Nicotine use disorder 07/14/2023    Cannabis use disorder 07/14/2023    Bipolar disorder, current episode manic severe with psychotic features (HCC) 07/14/2023    Anxiety 07/14/2023    Amphetamine use  disorder, severe ([facility]) 2023    Schizophrenia ([facility]) 2023    COPD with exacerbation ([facility]) 2016    Schizoaffective disorder, bipolar type ([facility]) 2015    Nephrolithiasis 2015    Impaired fasting glucose 2014    Hyperlipidemia 2014    Thyroid disorder 2014    Undersocialized conduct disorder, aggressive type 2014       No Known Allergies    OB History    Para Term  AB Living   3       2 1   SAB IAB Ectopic Multiple Live Births           1      # Outcome Date GA Lbr Rip/2nd Weight Sex Delivery Anes PTL Lv   3             2 AB            1 AB               Obstetric Comments   Vaginal delivery      31 yo daughter, no grands  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    Review of Systems   Constitutional: Negative for chills, fatigue, fever and unexpected weight change.   Respiratory: Negative for shortness of breath.    Gastrointestinal: Negative for anal bleeding, blood in stool, constipation and diarrhea.   Genitourinary: Negative for difficulty urinating, dysuria and hematuria.     Physical Exam   Constitutional: She appears well-developed and well-nourished. No distress.   HENT: atraumatic, EOMI  Head: Normocephalic.   Neck: Normal range of motion. Neck supple.   Pulmonary: Effort normal.  Breasts: bilateral without masses, skin changes or nipple discharge. Bilaterally soft and warm to touch. No areas of erythema or pain.  Abdominal: Soft.   Pelvic exam was performed with patient supine. No labial fusion. There is no rash, tenderness, lesion or injury on the right labia. There is no rash, tenderness, lesion or injury on the left labia. Urethral meatus does not show any tenderness, inflammation or discharge. Palpation of midline bladder without pain or discomfort. Uterus is not deviated, not enlarged, not fixed and not tender. Cervix exhibits no motion tenderness, no  discharge and no friability. Right adnexum displays no mass, no tenderness and no fullness. Left adnexum displays no mass, no tenderness and no fullness. No erythema or tenderness in the vagina. No foreign body in the vagina. No signs of injury around the vagina or anus. Perineum without lesions, signs of injury, erythema or swelling. No vaginal discharge found.   Lymphadenopathy:        Right: No inguinal adenopathy present.        Left: No inguinal adenopathy present.

## 2024-03-29 LAB
HPV HR 12 DNA CVX QL NAA+PROBE: NEGATIVE
HPV16 DNA CVX QL NAA+PROBE: NEGATIVE
HPV18 DNA CVX QL NAA+PROBE: NEGATIVE

## 2024-04-07 LAB
LAB AP GYN PRIMARY INTERPRETATION: NORMAL
Lab: NORMAL

## 2024-07-16 ENCOUNTER — VBI (OUTPATIENT)
Dept: ADMINISTRATIVE | Facility: OTHER | Age: 59
End: 2024-07-16

## 2024-07-16 NOTE — TELEPHONE ENCOUNTER
07/16/24 1:53 PM     Chart reviewed for Pap Smear (HPV) aka Cervical Cancer Screening ; nothing is submitted to the patient's insurance at this time.     Yanelis Kulkarni   [facility]

## 2024-10-02 ENCOUNTER — NURSE TRIAGE (OUTPATIENT)
Age: 59
End: 2024-10-02

## 2024-10-02 NOTE — TELEPHONE ENCOUNTER
"Pt called in stating she had a UTI about 2 weeks ago. Treated with antibiotics through [facility]. States she is still having dysuria, hematuria, vaginal irritation, itching and white clumpy discharge. Symptoms ongoing for about a month. Denies urinary frequency, urgency, back pain or fever. She tried monistat 1 with no relief. Pt given appointment for tomorrow morning and is thankful. Aware to follow up in the meantime with any worsening symptoms/concerns. Pt thankful.       Reason for Disposition   Symptoms of a yeast infection' (i.e., itchy, white discharge, not bad smelling) and not improved > 3 days following Care Advice    Answer Assessment - Initial Assessment Questions  1. DISCHARGE: \"Describe the discharge.\" (e.g., white, yellow, green, gray, foamy, cottage cheese-like)      White clumpy discharge  2. ODOR: \"Is there a bad odor?\"      denies  3. ONSET: \"When did the discharge begin?\"      A month ago  4. RASH: \"Is there a rash in that area?\" If Yes, ask: \"Describe it.\" (e.g., redness, blisters, sores, bumps)      denies  5. ABDOMINAL PAIN: \"Are you having any abdominal pain?\" If Yes, ask: \"What does it feel like? \" (e.g., crampy, dull, intermittent, constant)       denies  6. ABDOMINAL PAIN SEVERITY: If present, ask: \"How bad is it?\"  (e.g., mild, moderate, severe)   - MILD - doesn't interfere with normal activities    - MODERATE - interferes with normal activities or awakens from sleep    - SEVERE - patient doesn't want to move (R/O peritonitis)       denies  7. CAUSE: \"What do you think is causing the discharge?\" \"Have you had the same problem before? What happened then?\"      Yeast infection  8. OTHER SYMPTOMS: \"Do you have any other symptoms?\" (e.g., fever, itching, vaginal bleeding, pain with urination, injury to genital area, vaginal foreign body)      denies  9. PREGNANCY: \"Is there any chance you are pregnant?\" \"When was your last menstrual period?\"      postmenopausal    Protocols used: Vaginal " Discharge-ADULT-[place]

## 2024-10-03 ENCOUNTER — OFFICE VISIT (OUTPATIENT)
Dept: OBGYN CLINIC | Facility: MEDICAL CENTER | Age: 59
End: 2024-10-03
Payer: COMMERCIAL

## 2024-10-03 VITALS — BODY MASS INDEX: 21.77 KG/M2 | WEIGHT: 139 LBS | DIASTOLIC BLOOD PRESSURE: 64 MMHG | SYSTOLIC BLOOD PRESSURE: 96 MMHG

## 2024-10-03 DIAGNOSIS — B37.31 VAGINAL YEAST INFECTION: Primary | ICD-10-CM

## 2024-10-03 DIAGNOSIS — Z11.3 SCREENING FOR STD (SEXUALLY TRANSMITTED DISEASE): ICD-10-CM

## 2024-10-03 DIAGNOSIS — N39.498 OTHER URINARY INCONTINENCE: ICD-10-CM

## 2024-10-03 LAB
BV WHIFF TEST VAG QL: ABNORMAL
CLUE CELLS SPEC QL WET PREP: ABNORMAL
PH SMN: 5.5 [PH]
SL AMB POCT WET MOUNT: ABNORMAL
T VAGINALIS VAG QL WET PREP: ABNORMAL
YEAST VAG QL WET PREP: ABNORMAL

## 2024-10-03 PROCEDURE — 99213 OFFICE O/P EST LOW 20 MIN: CPT | Performed by: NURSE PRACTITIONER

## 2024-10-03 PROCEDURE — 87210 SMEAR WET MOUNT SALINE/INK: CPT | Performed by: NURSE PRACTITIONER

## 2024-10-03 PROCEDURE — 87491 CHLMYD TRACH DNA AMP PROBE: CPT | Performed by: NURSE PRACTITIONER

## 2024-10-03 PROCEDURE — 87591 N.GONORRHOEAE DNA AMP PROB: CPT | Performed by: NURSE PRACTITIONER

## 2024-10-03 RX ORDER — NYSTATIN AND TRIAMCINOLONE ACETONIDE 100000; 1 [USP'U]/G; MG/G
OINTMENT TOPICAL 2 TIMES DAILY
Qty: 30 G | Refills: 0 | Status: SHIPPED | OUTPATIENT
Start: 2024-10-03 | End: 2024-10-10

## 2024-10-03 RX ORDER — FLUCONAZOLE 150 MG/1
150 TABLET ORAL ONCE
Qty: 1 TABLET | Refills: 0 | Status: SHIPPED | OUTPATIENT
Start: 2024-10-03 | End: 2024-10-03

## 2024-10-03 NOTE — PATIENT INSTRUCTIONS
Wear cotton underwear, loose pants, skirts and/or dresses.   Avoid synthetic underwear.   Promptly remove a swimsuit or excise clothing once done with activity.   Bathe daily with dove bar soap. Rinse well and pat dry with a soft towel. Avoid scrubbing with a washcloth or loofah.  Avoid bubble baths.   Use unscented detergents.   Avoid use of baby or flushable wipes to vulva. Rinse with warm water.   Avoid feminine sprays and powders.   Limit the use of a katie pad.   Consider daily refrigerated probiotic.   Protect skin with coconut oil as needed.   Discontinue vagisil products.   GC/CT collected today. Aware of potential out of pocket cost.   Smoking cessation strongly recommnended along with healthy diet and adequate hydration.   Rx sent to pharmacy on file.   Return to office for annual exam 3/25.

## 2024-10-03 NOTE — PROGRESS NOTES
Assessment/Plan:  Wear cotton underwear, loose pants, skirts and/or dresses.   Avoid synthetic underwear.   Promptly remove a swimsuit or excise clothing once done with activity.   Bathe daily with dove bar soap. Rinse well and pat dry with a soft towel. Avoid scrubbing with a washcloth or loofah.  Avoid bubble baths.   Use unscented detergents.   Avoid use of baby or flushable wipes to vulva. Rinse with warm water.   Avoid feminine sprays and powders.   Limit the use of a katie pad.   Consider daily refrigerated probiotic.   Protect skin with coconut oil as needed.   Discontinue vagisil products.   GC/CT collected today. Aware of potential out of pocket cost.   Smoking cessation strongly recommnended along with healthy diet and adequate hydration.   Rx sent to pharmacy on file.   Return to office for annual exam 3/25.        1. Vaginal yeast infection  -     fluconazole (DIFLUCAN) 150 mg tablet; Take 1 tablet (150 mg total) by mouth once for 1 dose  -     nystatin-triamcinolone (MYCOLOG-II) ointment; Apply topically 2 (two) times a day for 7 days  -     POCT wet mount  2. Other urinary incontinence  -     Urinalysis with microscopic; Future  -     Urine culture; Future  -     POCT wet mount  3. Screening for STD (sexually transmitted disease)  -     Chlamydia/GC amplified DNA by PCR  -     POCT wet mount             Subjective:      Patient ID: Capri Brandon is a 58 y.o. female.    HPI    Capri Brandon is a 58 y.o.  female who is here today for a problem visit .   [facility]. Compliant with medication. Symptoms of low back pain and urinary frequency  resolved after treatment.  Now having thick white discharge. She used monistat 1 day twice before her UTI and now once again since (about 2 weeks ago). No improvement of symptoms.   Now has external vulvovaginal itching.     All of these symptoms occurred after receiving oral sex with a new partner at that  time.   Menopausal with no vaginal bleeding.     The following portions of the patient's history were reviewed and updated as appropriate: allergies, current medications, past family history, past medical history, past social history, past surgical history, and problem list.    Review of Systems   Constitutional: Negative.    Gastrointestinal:  Negative for abdominal pain, constipation, diarrhea, nausea and vomiting.   Genitourinary:  Positive for vaginal discharge. Negative for decreased urine volume, difficulty urinating, dyspareunia, dysuria, frequency, genital sores, pelvic pain, urgency, vaginal bleeding and vaginal pain.        Vulvar irritation     Musculoskeletal:  Negative for arthralgias and myalgias.   Skin: Negative.    Hematological:  Negative for adenopathy.   Psychiatric/Behavioral: Negative.     All other systems reviewed and are negative.        Objective:      BP 96/64 (BP Location: Left arm, Patient Position: Sitting, Cuff Size: Standard)   Wt 63 kg (139 lb)   BMI 21.77 kg/m²          Physical Exam  Vitals and nursing note reviewed.   Constitutional:       Appearance: Normal appearance. She is well-developed.   Genitourinary:     General: Normal vulva.      Exam position: Lithotomy position.      Labia:         Right: Rash and tenderness present. No lesion or injury.         Left: Rash and tenderness present. No lesion or injury.       Urethra: No prolapse, urethral pain, urethral swelling or urethral lesion.      Vagina: No signs of injury and foreign body. Vaginal discharge (thick yellow) present. No erythema, tenderness or bleeding.      Cervix: No cervical motion tenderness, discharge, friability, lesion, erythema or cervical bleeding.      Uterus: Normal.       Adnexa: Right adnexa normal and left adnexa normal.        Right: No mass, tenderness or fullness.          Left: No mass, tenderness or fullness.        Rectum: No external hemorrhoid.          Comments: Erythema noted on labia  majora along with satellite lesion into groin.   Lymphadenopathy:      Lower Body: No right inguinal adenopathy. No left inguinal adenopathy.   Skin:     General: Skin is warm and dry.   Neurological:      Mental Status: She is alert and oriented to person, place, and time.   Psychiatric:         Mood and Affect: Mood normal.         Behavior: Behavior normal.

## 2024-10-04 LAB
C TRACH DNA SPEC QL NAA+PROBE: NEGATIVE
N GONORRHOEA DNA SPEC QL NAA+PROBE: NEGATIVE

## 2024-10-07 ENCOUNTER — TELEPHONE (OUTPATIENT)
Age: 59
End: 2024-10-07

## 2024-10-07 NOTE — TELEPHONE ENCOUNTER
Pt asking for a return call from provider regarding lab results , pt is asking why is still having discharge.

## 2024-10-07 NOTE — TELEPHONE ENCOUNTER
"RN returned a call to patient to discuss lingering symptoms. Per patient, she continues to have thick white-yellow vaginal discharge, but itching has resolved. Feels like \"bladder is leaking\" but states has been feeling that way since she had a UTI several weeks ago. Unsure if needing additional treatment to fully clear yeast/discharge.Pharmacy on file confirmed. RN advised will route to provider for recommendations, and staff will return a call. Pt agreeable to plan. No further questions.   "

## 2024-10-09 ENCOUNTER — OFFICE VISIT (OUTPATIENT)
Dept: OBGYN CLINIC | Facility: CLINIC | Age: 59
End: 2024-10-09
Payer: COMMERCIAL

## 2024-10-09 VITALS — DIASTOLIC BLOOD PRESSURE: 84 MMHG | SYSTOLIC BLOOD PRESSURE: 102 MMHG | WEIGHT: 137 LBS | BODY MASS INDEX: 21.46 KG/M2

## 2024-10-09 DIAGNOSIS — Z11.3 SCREEN FOR STD (SEXUALLY TRANSMITTED DISEASE): ICD-10-CM

## 2024-10-09 DIAGNOSIS — N76.0 BV (BACTERIAL VAGINOSIS): Primary | ICD-10-CM

## 2024-10-09 DIAGNOSIS — B96.89 BV (BACTERIAL VAGINOSIS): Primary | ICD-10-CM

## 2024-10-09 PROCEDURE — 87210 SMEAR WET MOUNT SALINE/INK: CPT | Performed by: NURSE PRACTITIONER

## 2024-10-09 PROCEDURE — 87661 TRICHOMONAS VAGINALIS AMPLIF: CPT | Performed by: NURSE PRACTITIONER

## 2024-10-09 PROCEDURE — 99213 OFFICE O/P EST LOW 20 MIN: CPT | Performed by: NURSE PRACTITIONER

## 2024-10-09 RX ORDER — METRONIDAZOLE 500 MG/1
500 TABLET ORAL EVERY 12 HOURS SCHEDULED
Qty: 14 TABLET | Refills: 0 | Status: SHIPPED | OUTPATIENT
Start: 2024-10-09 | End: 2024-10-16

## 2024-10-09 NOTE — PATIENT INSTRUCTIONS
Patient Education     Vaginal discharge   The Basics   Written by the doctors and editors at [facility]   What is vaginal discharge? -- This the fluid that comes out of the vagina (figure 1). Vaginal discharge is made up of cells from the vagina and cervix, bacteria, mucus, and water.  Can vaginal discharge be normal? -- Yes. Normally, people who get monthly periods can have a small amount of vaginal discharge each day. Normal vaginal discharge can be white, clear, or thick, but usually does not smell bad.  The amounts of vaginal discharge are different for each person. Also, it is normal to have more or less vaginal discharge at different times. For example, you might notice more discharge:   If you are pregnant   If you use birth control that contains hormones   During the 2 weeks before your period  If you have been through menopause, you will probably notice that you have less vaginal discharge. (Menopause is when you stop having monthly periods.)  When is vaginal discharge abnormal? -- Vaginal discharge is abnormal when it happens along with the following symptoms:   Itching of the vagina or the area around the vagina   Redness, pain, or swelling around the vagina   Discharge that is foamy, greenish-yellow, or bloody   Discharge that smells bad   Pain when urinating or having sex   Pain in the lower part of the belly   Fever  What are the causes of abnormal vaginal discharge? -- Different conditions can cause abnormal vaginal discharge. The most common causes are:   An infection in the vagina, cervix, or uterus   A reaction to something in the vagina, such as a tampon or condom   A reaction to a soap or other product that was in the vagina   Changes in the body that happen after menopause  Should I see a doctor or nurse? -- Yes. If you have abnormal vaginal discharge, see a doctor or nurse so they can try to figure out the cause. They will talk with you and do an exam. They will also take a sample of your vaginal  discharge, then do lab tests on the sample to look for an infection.  Do not try to treat abnormal vaginal discharge by yourself. It could cause your symptoms to get worse.  How is abnormal vaginal discharge treated? -- Treatment depends on the cause of your abnormal vaginal discharge. For example, different vaginal infections are treated with different medicines. If you have a vaginal infection, your doctor or nurse will want to figure out what type of infection you have so they can treat it with the right medicine.  If your abnormal vaginal discharge is caused by certain types of infections, your sex partner(s) will also need to see a doctor for treatment. (You might want to stop having sex until you know what is causing your symptoms.)  Can abnormal vaginal discharge be prevented? -- Sometimes. You can help prevent abnormal vaginal discharge if you:   Use warm water and unscented non-soap cleanser to wash your vulva (the vulva is the area of skin around the outside of the vagina).   Take baths in plain warm water. Do not use scented bath products.   Do not use sprays or powders on your vagina.   Do not douche (put liquid inside your vagina to rinse it out).   Do not wipe with baby wipes or scented toilet paper after you use the toilet.  All topics are updated as new evidence becomes available and our peer review process is complete.  This topic retrieved from [employer] on: Feb 26, 2024.  Topic 41748 Version 13.0  Release: 32.2.4 - C32.56  © 2024 UpToDate, Inc. and/or its affiliates. All rights reserved.  figure 1: Adult female external genitalia     This drawing shows the different parts of the genitals.  Graphic 06837 Version 9.0  Consumer Information Use and Disclaimer   Disclaimer: This generalized information is a limited summary of diagnosis, treatment, and/or medication information. It is not meant to be comprehensive and should be used as a tool to help the user understand and/or assess potential diagnostic  and treatment options. It does NOT include all information about conditions, treatments, medications, side effects, or risks that may apply to a specific patient. It is not intended to be medical advice or a substitute for the medical advice, diagnosis, or treatment of a health care provider based on the health care provider's examination and assessment of a patient's specific and unique circumstances. Patients must speak with a health care provider for complete information about their health, medical questions, and treatment options, including any risks or benefits regarding use of medications. This information does not endorse any treatments or medications as safe, effective, or approved for treating a specific patient. UpToDate, Inc. and its affiliates disclaim any warranty or liability relating to this information or the use thereof.The use of this information is governed by the Terms of Use, available at https://www.woltersPuralyticsuwer.com/en/know/clinical-effectiveness-terms. 2024© UpToDate, Inc. and its affiliates and/or licensors. All rights reserved.  Copyright   © 2024 UpToDate, Inc. and/or its affiliates. All rights reserved.

## 2024-10-09 NOTE — PROGRESS NOTES
Diagnoses and all orders for this visit:    BV (bacterial vaginosis)  -     metroNIDAZOLE (FLAGYL) 500 mg tablet; Take 1 tablet (500 mg total) by mouth every 12 (twelve) hours for 7 days  -     POCT wet mount    Screen for STD (sexually transmitted disease)  -     Trichomonas vaginalis Thin prep; Future    Call if no symptom improvement, all questions answered, return for annual.          Pleasant 58 y.o. female patient here for continued vaginal complaints. Yellowish discharge continues. She used diflucan on 10/3 with relief of itching but she is needing to use liners for the discharge.  Gonorrhea and Chlamydia testing were negative.  She does admit to a new partner.  She denies any other treatments tried. She denies recent antibiotic use. She denies douching. She denies fever, pelvic pain or dyspareunia.  She denies any sexual activity since her symptoms started.     Past Medical History:   Diagnosis Date    Bipolar disorder (HCC)     LAST ASSESSED 2014     Past Surgical History:   Procedure Laterality Date    CYSTOSCOPY      DIAGNOSTIC   2014 DR POWELL 2015    DILATION AND CURETTAGE OF UTERUS      X2     Social History     Tobacco Use    Smoking status: Every Day     Current packs/day: 0.25     Types: Cigarettes    Smokeless tobacco: Never   Vaping Use    Vaping status: Never Used   Substance Use Topics    Alcohol use: No    Drug use: Yes     Types: Marijuana     Comment: prescribed     Family History   Problem Relation Age of Onset    COPD Mother     Seizures Mother     Hip fracture Mother     Cancer Father        Current Outpatient Medications:     Aristada 662 MG/2.4ML PRSY, , Disp: , Rfl:     metroNIDAZOLE (FLAGYL) 500 mg tablet, Take 1 tablet (500 mg total) by mouth every 12 (twelve) hours for 7 days, Disp: 14 tablet, Rfl: 0    nystatin-triamcinolone (MYCOLOG-II) ointment, Apply topically 2 (two) times a day for 7 days, Disp: 30 g, Rfl: 0    No Known Allergies  OB History    Para  Term  AB Living   3       2 1   SAB IAB Ectopic Multiple Live Births           1      # Outcome Date GA Lbr Rip/2nd Weight Sex Type Anes PTL Lv   3             2 AB            1 AB               Obstetric Comments   Vaginal delivery        Vitals:    10/09/24 1331   BP: 102/84   Weight: 62.1 kg (137 lb)     Body mass index is 21.46 kg/m².  No LMP recorded. Patient is postmenopausal.    Review of Systems   Constitutional: Negative for chills, fatigue, fever and unexpected weight change.   Respiratory: Negative for shortness of breath.    Gastrointestinal: Negative for anal bleeding, blood in stool, constipation and diarrhea.   Genitourinary: Negative for difficulty urinating, dysuria and hematuria.     Physical Exam   Constitutional: She appears well-developed and well-nourished. No distress. Alert and oriented.  HENT: atraumatic, EOMI bilaterally  Head: Normocephalic.   Neck: Normal range of motion. Neck supple.   Pulmonary: Effort normal.  Abdominal: Soft.   Pelvic exam was performed with patient supine. No labial fusion. There is no rash, tenderness, lesion or injury on the right labia. There is no rash, tenderness, lesion or injury on the left labia. Urethral meatus does not show any tenderness, inflammation or discharge. Palpation of midline bladder without pain or discomfort. Uterus is not deviated, not enlarged, not fixed and not tender. Cervix exhibits no motion tenderness, no discharge and no friability. Right adnexum displays no mass, no tenderness and no fullness. Left adnexum displays no mass, no tenderness and no fullness. No erythema or tenderness in the vagina. No foreign body in the vagina. No signs of injury around the vagina. Moderate amount of YELLOWISH Vaginal discharge found. Perineum and anus without areas of injury. No lesions noted or swelling.  Lymphadenopathy:        Right: No inguinal adenopathy present.        Left: No inguinal adenopathy present.     Wet mount showed +Rare  hyphae, ph 5.5, + wbcs noted, no trich seen, whiff neg

## 2024-10-11 LAB — T VAGINALIS DNA SPEC QL NAA+PROBE: POSITIVE

## 2024-10-30 NOTE — TELEPHONE ENCOUNTER
----- Message from Julio Hernandez DO sent at 5/4/2021  9:26 AM EDT -----  No evidence of UTI  Pt to finish amoxicillin for dental infection 
Spoke with: patient  Re: UTI/antibiotic   Provider's message/resuts/instructions/inquiries relayed in full detal   Comments:
lmtcb
No

## 2025-03-06 ENCOUNTER — TELEPHONE (OUTPATIENT)
Age: 60
End: 2025-03-06

## 2025-03-06 DIAGNOSIS — Z01.89 ROUTINE LAB DRAW: Primary | ICD-10-CM

## 2025-03-06 NOTE — TELEPHONE ENCOUNTER
Pt called and is asking if a routine lab panel can please be placed for her?    She would also like a call when they are in. Thanks.

## 2025-03-07 ENCOUNTER — OFFICE VISIT (OUTPATIENT)
Age: 60
End: 2025-03-07
Payer: COMMERCIAL

## 2025-03-07 VITALS
RESPIRATION RATE: 18 BRPM | WEIGHT: 134 LBS | SYSTOLIC BLOOD PRESSURE: 130 MMHG | OXYGEN SATURATION: 98 % | HEART RATE: 82 BPM | BODY MASS INDEX: 21.03 KG/M2 | DIASTOLIC BLOOD PRESSURE: 82 MMHG | HEIGHT: 67 IN

## 2025-03-07 DIAGNOSIS — J44.1 COPD WITH EXACERBATION (HCC): ICD-10-CM

## 2025-03-07 DIAGNOSIS — L82.1 SEBORRHEIC KERATOSIS: ICD-10-CM

## 2025-03-07 DIAGNOSIS — Z00.00 ANNUAL PHYSICAL EXAM: Primary | ICD-10-CM

## 2025-03-07 PROCEDURE — 99396 PREV VISIT EST AGE 40-64: CPT

## 2025-03-07 NOTE — ASSESSMENT & PLAN NOTE
Still smoking about 1/4 pack of cigarettes per day.  Discussed cessation however patient does not desire at this time.

## 2025-03-10 ENCOUNTER — APPOINTMENT (OUTPATIENT)
Age: 60
End: 2025-03-10
Payer: COMMERCIAL

## 2025-03-10 DIAGNOSIS — Z01.89 ROUTINE LAB DRAW: ICD-10-CM

## 2025-03-10 LAB
ALBUMIN SERPL BCG-MCNC: 4.5 G/DL (ref 3.5–5)
ALP SERPL-CCNC: 75 U/L (ref 34–104)
ALT SERPL W P-5'-P-CCNC: 10 U/L (ref 7–52)
ANION GAP SERPL CALCULATED.3IONS-SCNC: 7 MMOL/L (ref 4–13)
AST SERPL W P-5'-P-CCNC: 19 U/L (ref 13–39)
BASOPHILS # BLD AUTO: 0.05 THOUSANDS/ÂΜL (ref 0–0.1)
BASOPHILS NFR BLD AUTO: 1 % (ref 0–1)
BILIRUB SERPL-MCNC: 0.46 MG/DL (ref 0.2–1)
BUN SERPL-MCNC: 17 MG/DL (ref 5–25)
CALCIUM SERPL-MCNC: 9.5 MG/DL (ref 8.4–10.2)
CHLORIDE SERPL-SCNC: 102 MMOL/L (ref 96–108)
CHOLEST SERPL-MCNC: 188 MG/DL (ref ?–200)
CO2 SERPL-SCNC: 30 MMOL/L (ref 21–32)
CREAT SERPL-MCNC: 0.71 MG/DL (ref 0.6–1.3)
EOSINOPHIL # BLD AUTO: 0.16 THOUSAND/ÂΜL (ref 0–0.61)
EOSINOPHIL NFR BLD AUTO: 3 % (ref 0–6)
ERYTHROCYTE [DISTWIDTH] IN BLOOD BY AUTOMATED COUNT: 14.6 % (ref 11.6–15.1)
EST. AVERAGE GLUCOSE BLD GHB EST-MCNC: 123 MG/DL
GFR SERPL CREATININE-BSD FRML MDRD: 94 ML/MIN/1.73SQ M
GLUCOSE P FAST SERPL-MCNC: 85 MG/DL (ref 65–99)
HBA1C MFR BLD: 5.9 %
HCT VFR BLD AUTO: 39.7 % (ref 34.8–46.1)
HDLC SERPL-MCNC: 65 MG/DL
HGB BLD-MCNC: 13.1 G/DL (ref 11.5–15.4)
IMM GRANULOCYTES # BLD AUTO: 0.02 THOUSAND/UL (ref 0–0.2)
IMM GRANULOCYTES NFR BLD AUTO: 0 % (ref 0–2)
LDLC SERPL CALC-MCNC: 112 MG/DL (ref 0–100)
LYMPHOCYTES # BLD AUTO: 2.63 THOUSANDS/ÂΜL (ref 0.6–4.47)
LYMPHOCYTES NFR BLD AUTO: 41 % (ref 14–44)
MCH RBC QN AUTO: 29.1 PG (ref 26.8–34.3)
MCHC RBC AUTO-ENTMCNC: 33 G/DL (ref 31.4–37.4)
MCV RBC AUTO: 88 FL (ref 82–98)
MONOCYTES # BLD AUTO: 0.43 THOUSAND/ÂΜL (ref 0.17–1.22)
MONOCYTES NFR BLD AUTO: 7 % (ref 4–12)
NEUTROPHILS # BLD AUTO: 3.18 THOUSANDS/ÂΜL (ref 1.85–7.62)
NEUTS SEG NFR BLD AUTO: 48 % (ref 43–75)
NONHDLC SERPL-MCNC: 123 MG/DL
NRBC BLD AUTO-RTO: 0 /100 WBCS
PLATELET # BLD AUTO: 306 THOUSANDS/UL (ref 149–390)
PMV BLD AUTO: 10.5 FL (ref 8.9–12.7)
POTASSIUM SERPL-SCNC: 4 MMOL/L (ref 3.5–5.3)
PROT SERPL-MCNC: 6.9 G/DL (ref 6.4–8.4)
RBC # BLD AUTO: 4.5 MILLION/UL (ref 3.81–5.12)
SODIUM SERPL-SCNC: 139 MMOL/L (ref 135–147)
TRIGL SERPL-MCNC: 56 MG/DL (ref ?–150)
TSH SERPL DL<=0.05 MIU/L-ACNC: 2.44 UIU/ML (ref 0.45–4.5)
WBC # BLD AUTO: 6.47 THOUSAND/UL (ref 4.31–10.16)

## 2025-03-10 PROCEDURE — 80053 COMPREHEN METABOLIC PANEL: CPT

## 2025-03-10 PROCEDURE — 80061 LIPID PANEL: CPT

## 2025-03-10 PROCEDURE — 84443 ASSAY THYROID STIM HORMONE: CPT

## 2025-03-10 PROCEDURE — 36415 COLL VENOUS BLD VENIPUNCTURE: CPT

## 2025-03-10 PROCEDURE — 85025 COMPLETE CBC W/AUTO DIFF WBC: CPT

## 2025-03-10 PROCEDURE — 83036 HEMOGLOBIN GLYCOSYLATED A1C: CPT

## 2025-03-11 ENCOUNTER — RESULTS FOLLOW-UP (OUTPATIENT)
Age: 60
End: 2025-03-11

## 2025-03-11 NOTE — TELEPHONE ENCOUNTER
----- Message from Abhijeet Mullen PA-C sent at 3/11/2025  7:47 AM EDT -----  Labs are good. A1C and cholesterol improved from last year. Keep working on eating a well balanced diet with plenty of fruits and veggies!

## 2025-03-12 NOTE — PROGRESS NOTES
Assessment/Plan:  Calcium 2346-4150 mg (in divided doses-max 600 mg at one time) + 600-1000 IU Vit D daily.   Exercise 150-300 minutes per week minimum including weight bearing exercises.   Pap with high risk HPV Q 5 years, if normal.  Due   Call your insurance company to verify coverage prior to completing any ordered tests.   Annual mammogram already ordered.   Monthly breast self exam recommended.    Colonoscopy-   Due        Kegels 20 times twice daily.   Consistent condom use will prevent a sexually transmitted infection.   Silicone based lubricant with sex. (Use water based lubricant with condoms or sexual toys.)    Vaginal moisturizers twice weekly as needed.   Smoking cessation recommended. Declined referral to smoking cessation.   Return to office in one year or sooner, if needed.        1. Encntr for gyn exam (general) (routine) w/o abn findings  2. Encounter for screening mammogram for breast cancer  3. Cigarette nicotine dependence without complication             Subjective:      Patient ID: Capri Brandon is a 59 y.o. female.    HPI    Capri Brandon is a 59 y.o.  (31 yo girl) female who is here today for her annual visit. No gynecologic health concerns.   Last in office on 10/9/24 with MADALYN DELEON for BV. She was also trich positive. Completed treatment and symptoms resolved.    Menopausal with no vaginal bleeding.   Exercise- not regularly but stays active.   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   Smokes 0.5 ppd. No plan to quit.     Capri Brandon is sexually active with male partner of 2 months. Monogamous and feels safe in this relationship. Denies vaginal pain,bleeding or dryness.    She is not interested in STD screening today. Negative GC/CT testing on 10/3/24  She denies vaginal discharge, itching or pelvic pain.   She has no urinary concerns, does not have incontinence.  No bowel concerns.  No breast concerns.     Last pap: 2024 normal with negative HR  "HPV   Mammogram: 03/13/2024 normal with heterogeneously dense breast tissue LTC 7.72%  Colonoscopy: 03/18/2024 recall 3 years  DEXA scan: Not on file      Family history of cancer:   Cancer-related family history includes Cancer in her father.      The following portions of the patient's history were reviewed and updated as appropriate: allergies, current medications, past family history, past medical history, past social history, past surgical history, and problem list.    Review of Systems   Constitutional: Negative.  Negative for activity change, appetite change, chills, diaphoresis, fatigue, fever and unexpected weight change.   HENT:  Negative for congestion, dental problem, sneezing, sore throat and trouble swallowing.    Eyes:  Negative for visual disturbance.   Respiratory:  Negative for chest tightness and shortness of breath.    Cardiovascular:  Negative for chest pain and leg swelling.   Gastrointestinal:  Negative for abdominal pain, constipation, diarrhea, nausea and vomiting.   Genitourinary:  Negative for difficulty urinating, dyspareunia, dysuria, frequency, hematuria, pelvic pain, urgency, vaginal bleeding, vaginal discharge and vaginal pain.   Musculoskeletal:  Negative for back pain and neck pain.   Skin: Negative.    Allergic/Immunologic: Negative.    Neurological:  Negative for weakness and headaches.   Hematological:  Negative for adenopathy.   Psychiatric/Behavioral: Negative.           Objective:      /78 (BP Location: Right arm, Patient Position: Sitting, Cuff Size: Large)   Ht 5' 7\" (1.702 m)   BMI 20.99 kg/m²          Physical Exam  Vitals and nursing note reviewed.   Constitutional:       Appearance: Normal appearance. She is well-developed.   HENT:      Head: Normocephalic.   Neck:      Thyroid: No thyromegaly.   Cardiovascular:      Rate and Rhythm: Normal rate and regular rhythm.      Heart sounds: Normal heart sounds.   Pulmonary:      Effort: Pulmonary effort is normal.      " Breath sounds: Normal breath sounds.   Chest:   Breasts:     Breasts are symmetrical.      Right: Normal. No inverted nipple, mass, nipple discharge, skin change or tenderness.      Left: Normal. No inverted nipple, mass, nipple discharge, skin change or tenderness.   Abdominal:      Palpations: Abdomen is soft.   Genitourinary:     General: Normal vulva.      Exam position: Lithotomy position.      Labia:         Right: No rash, tenderness, lesion or injury.         Left: No rash, tenderness, lesion or injury.       Urethra: No prolapse, urethral pain, urethral swelling or urethral lesion.      Vagina: No signs of injury and foreign body. No vaginal discharge, erythema, tenderness, bleeding, lesions or prolapsed vaginal walls.      Cervix: Normal.      Uterus: Normal.       Adnexa: Right adnexa normal and left adnexa normal.        Right: No mass, tenderness or fullness.          Left: No mass, tenderness or fullness.        Rectum: No external hemorrhoid.      Comments: Slight vaginal atrophy  Musculoskeletal:         General: Normal range of motion.      Cervical back: Normal range of motion.   Lymphadenopathy:      Head:      Right side of head: No submental, submandibular, tonsillar or occipital adenopathy.      Left side of head: No submental, submandibular, tonsillar or occipital adenopathy.      Upper Body:      Right upper body: No supraclavicular or axillary adenopathy.      Left upper body: No supraclavicular or axillary adenopathy.      Lower Body: No right inguinal adenopathy. No left inguinal adenopathy.   Skin:     General: Skin is warm and dry.   Neurological:      Mental Status: She is alert and oriented to person, place, and time.   Psychiatric:         Mood and Affect: Mood normal.         Behavior: Behavior normal. Behavior is cooperative.

## 2025-03-13 ENCOUNTER — ANNUAL EXAM (OUTPATIENT)
Dept: OBGYN CLINIC | Facility: MEDICAL CENTER | Age: 60
End: 2025-03-13
Payer: COMMERCIAL

## 2025-03-13 VITALS — HEIGHT: 67 IN | DIASTOLIC BLOOD PRESSURE: 78 MMHG | SYSTOLIC BLOOD PRESSURE: 120 MMHG | BODY MASS INDEX: 20.99 KG/M2

## 2025-03-13 DIAGNOSIS — F17.210 CIGARETTE NICOTINE DEPENDENCE WITHOUT COMPLICATION: ICD-10-CM

## 2025-03-13 DIAGNOSIS — Z12.31 ENCOUNTER FOR SCREENING MAMMOGRAM FOR BREAST CANCER: ICD-10-CM

## 2025-03-13 DIAGNOSIS — Z01.419 ENCNTR FOR GYN EXAM (GENERAL) (ROUTINE) W/O ABN FINDINGS: Primary | ICD-10-CM

## 2025-03-13 PROCEDURE — 99396 PREV VISIT EST AGE 40-64: CPT | Performed by: NURSE PRACTITIONER

## 2025-03-13 NOTE — PATIENT INSTRUCTIONS
Calcium 4330-0700 mg (in divided doses-max 600 mg at one time) + 600-1000 IU Vit D daily.   Exercise 150-300 minutes per week minimum including weight bearing exercises.   Pap with high risk HPV Q 5 years, if normal.  Due 2029  Call your insurance company to verify coverage prior to completing any ordered tests.   Annual mammogram already ordered.   Monthly breast self exam recommended.    Colonoscopy-   Due 2027       Kegels 20 times twice daily.   Consistent condom use will prevent a sexually transmitted infection.   Silicone based lubricant with sex. (Use water based lubricant with condoms or sexual toys.)    Vaginal moisturizers twice weekly as needed.   Smoking cessation recommended. Declined referral to smoking cessation.   Return to office in one year or sooner, if needed.